# Patient Record
Sex: MALE | Race: BLACK OR AFRICAN AMERICAN | Employment: FULL TIME | URBAN - METROPOLITAN AREA
[De-identification: names, ages, dates, MRNs, and addresses within clinical notes are randomized per-mention and may not be internally consistent; named-entity substitution may affect disease eponyms.]

---

## 2023-03-04 ENCOUNTER — APPOINTMENT (OUTPATIENT)
Dept: ULTRASOUND IMAGING | Age: 53
End: 2023-03-04
Payer: COMMERCIAL

## 2023-03-04 ENCOUNTER — APPOINTMENT (OUTPATIENT)
Dept: GENERAL RADIOLOGY | Age: 53
End: 2023-03-04
Payer: COMMERCIAL

## 2023-03-04 ENCOUNTER — HOSPITAL ENCOUNTER (OUTPATIENT)
Age: 53
Setting detail: OBSERVATION
LOS: 2 days | Discharge: HOME | End: 2023-03-06
Attending: EMERGENCY MEDICINE | Admitting: STUDENT IN AN ORGANIZED HEALTH CARE EDUCATION/TRAINING PROGRAM
Payer: COMMERCIAL

## 2023-03-04 DIAGNOSIS — E11.65 HYPERGLYCEMIA DUE TO DIABETES MELLITUS (HCC): ICD-10-CM

## 2023-03-04 DIAGNOSIS — R74.8 ELEVATED LIPASE: ICD-10-CM

## 2023-03-04 DIAGNOSIS — N17.9 AKI (ACUTE KIDNEY INJURY) (HCC): Primary | ICD-10-CM

## 2023-03-04 DIAGNOSIS — E11.9 DIABETES MELLITUS, NEW ONSET (HCC): ICD-10-CM

## 2023-03-04 PROBLEM — R79.89 PSEUDOHYPONATREMIA: Status: ACTIVE | Noted: 2023-03-04

## 2023-03-04 PROBLEM — F19.10 POLYSUBSTANCE ABUSE (HCC): Status: ACTIVE | Noted: 2023-03-04

## 2023-03-04 LAB
ALBUMIN SERPL-MCNC: 3.4 G/DL (ref 3.5–5)
ALBUMIN/GLOB SERPL: 0.7 (ref 0.4–1.6)
ALP SERPL-CCNC: 118 U/L (ref 50–136)
ALT SERPL-CCNC: 41 U/L (ref 12–65)
AMPHET UR QL SCN: POSITIVE
ANION GAP SERPL CALC-SCNC: 8 MMOL/L (ref 2–11)
APPEARANCE UR: CLEAR
AST SERPL-CCNC: 62 U/L (ref 15–37)
BACTERIA URNS QL MICRO: 0 /HPF
BASOPHILS # BLD: 0.1 K/UL (ref 0–0.2)
BASOPHILS NFR BLD: 1 % (ref 0–2)
BENZODIAZ UR QL: NEGATIVE
BILIRUB SERPL-MCNC: 0.6 MG/DL (ref 0.2–1.1)
BILIRUB UR QL: NEGATIVE
BUN SERPL-MCNC: 23 MG/DL (ref 6–23)
CALCIUM SERPL-MCNC: 9.4 MG/DL (ref 8.3–10.4)
CANNABINOIDS UR QL SCN: POSITIVE
CASTS URNS QL MICRO: 0 /LPF
CHLORIDE SERPL-SCNC: 101 MMOL/L (ref 101–110)
CO2 SERPL-SCNC: 21 MMOL/L (ref 21–32)
COCAINE UR QL SCN: POSITIVE
COLOR UR: ABNORMAL
CREAT SERPL-MCNC: 2 MG/DL (ref 0.8–1.5)
CRYSTALS URNS QL MICRO: 0 /LPF
DIFFERENTIAL METHOD BLD: ABNORMAL
EOSINOPHIL # BLD: 0.6 K/UL (ref 0–0.8)
EOSINOPHIL NFR BLD: 4 % (ref 0.5–7.8)
EPI CELLS #/AREA URNS HPF: NORMAL /HPF
ERYTHROCYTE [DISTWIDTH] IN BLOOD BY AUTOMATED COUNT: 13.4 % (ref 11.9–14.6)
EST. AVERAGE GLUCOSE BLD GHB EST-MCNC: ABNORMAL MG/DL
FLUAV RNA SPEC QL NAA+PROBE: NOT DETECTED
FLUBV RNA SPEC QL NAA+PROBE: NOT DETECTED
GLOBULIN SER CALC-MCNC: 4.7 G/DL (ref 2.8–4.5)
GLUCOSE BLD STRIP.AUTO-MCNC: 276 MG/DL (ref 65–100)
GLUCOSE BLD STRIP.AUTO-MCNC: 367 MG/DL (ref 65–100)
GLUCOSE BLD STRIP.AUTO-MCNC: 378 MG/DL (ref 65–100)
GLUCOSE BLD STRIP.AUTO-MCNC: 423 MG/DL (ref 65–100)
GLUCOSE SERPL-MCNC: 441 MG/DL (ref 65–100)
GLUCOSE UR STRIP.AUTO-MCNC: 250 MG/DL
HBA1C MFR BLD: >14 % (ref 4.8–5.6)
HCT VFR BLD AUTO: 41 % (ref 41.1–50.3)
HGB BLD-MCNC: 14 G/DL (ref 13.6–17.2)
HGB UR QL STRIP: ABNORMAL
IMM GRANULOCYTES # BLD AUTO: 0.1 K/UL (ref 0–0.5)
IMM GRANULOCYTES NFR BLD AUTO: 0 % (ref 0–5)
KETONES UR QL STRIP.AUTO: ABNORMAL MG/DL
LEUKOCYTE ESTERASE UR QL STRIP.AUTO: NEGATIVE
LIPASE SERPL-CCNC: 594 U/L (ref 73–393)
LYMPHOCYTES # BLD: 5.8 K/UL (ref 0.5–4.6)
LYMPHOCYTES NFR BLD: 42 % (ref 13–44)
MCH RBC QN AUTO: 28.5 PG (ref 26.1–32.9)
MCHC RBC AUTO-ENTMCNC: 34.1 G/DL (ref 31.4–35)
MCV RBC AUTO: 83.5 FL (ref 82–102)
MONOCYTES # BLD: 1.6 K/UL (ref 0.1–1.3)
MONOCYTES NFR BLD: 12 % (ref 4–12)
MUCOUS THREADS URNS QL MICRO: 0 /LPF
NEUTS SEG # BLD: 5.8 K/UL (ref 1.7–8.2)
NEUTS SEG NFR BLD: 42 % (ref 43–78)
NITRITE UR QL STRIP.AUTO: NEGATIVE
NRBC # BLD: 0 K/UL (ref 0–0.2)
OPIATES UR QL: NEGATIVE
OTHER OBSERVATIONS: NORMAL
PH UR STRIP: 5 (ref 5–9)
PLATELET # BLD AUTO: 298 K/UL (ref 150–450)
PMV BLD AUTO: 12.1 FL (ref 9.4–12.3)
POTASSIUM SERPL-SCNC: 3.6 MMOL/L (ref 3.5–5.1)
PROT SERPL-MCNC: 8.1 G/DL (ref 6.3–8.2)
PROT UR STRIP-MCNC: ABNORMAL MG/DL
RBC # BLD AUTO: 4.91 M/UL (ref 4.23–5.6)
RBC #/AREA URNS HPF: NORMAL /HPF
SARS-COV-2 RDRP RESP QL NAA+PROBE: NOT DETECTED
SERVICE CMNT-IMP: ABNORMAL
SODIUM SERPL-SCNC: 130 MMOL/L (ref 133–143)
SOURCE: NORMAL
SP GR UR REFRACTOMETRY: 1.03 (ref 1–1.02)
UROBILINOGEN UR QL STRIP.AUTO: 0.2 EU/DL (ref 0.2–1)
WBC # BLD AUTO: 13.9 K/UL (ref 4.3–11.1)
WBC URNS QL MICRO: NORMAL /HPF

## 2023-03-04 PROCEDURE — 6360000002 HC RX W HCPCS: Performed by: PHYSICIAN ASSISTANT

## 2023-03-04 PROCEDURE — 6360000002 HC RX W HCPCS: Performed by: INTERNAL MEDICINE

## 2023-03-04 PROCEDURE — 87635 SARS-COV-2 COVID-19 AMP PRB: CPT

## 2023-03-04 PROCEDURE — 81015 MICROSCOPIC EXAM OF URINE: CPT

## 2023-03-04 PROCEDURE — 96374 THER/PROPH/DIAG INJ IV PUSH: CPT | Performed by: EMERGENCY MEDICINE

## 2023-03-04 PROCEDURE — 82962 GLUCOSE BLOOD TEST: CPT

## 2023-03-04 PROCEDURE — 81001 URINALYSIS AUTO W/SCOPE: CPT

## 2023-03-04 PROCEDURE — 1100000000 HC RM PRIVATE

## 2023-03-04 PROCEDURE — 80307 DRUG TEST PRSMV CHEM ANLYZR: CPT

## 2023-03-04 PROCEDURE — 2580000003 HC RX 258: Performed by: INTERNAL MEDICINE

## 2023-03-04 PROCEDURE — 85025 COMPLETE CBC W/AUTO DIFF WBC: CPT

## 2023-03-04 PROCEDURE — 83690 ASSAY OF LIPASE: CPT

## 2023-03-04 PROCEDURE — 87502 INFLUENZA DNA AMP PROBE: CPT

## 2023-03-04 PROCEDURE — 80053 COMPREHEN METABOLIC PANEL: CPT

## 2023-03-04 PROCEDURE — 71045 X-RAY EXAM CHEST 1 VIEW: CPT

## 2023-03-04 PROCEDURE — 83036 HEMOGLOBIN GLYCOSYLATED A1C: CPT

## 2023-03-04 PROCEDURE — 76775 US EXAM ABDO BACK WALL LIM: CPT

## 2023-03-04 PROCEDURE — 2580000003 HC RX 258: Performed by: PHYSICIAN ASSISTANT

## 2023-03-04 PROCEDURE — 96361 HYDRATE IV INFUSION ADD-ON: CPT | Performed by: EMERGENCY MEDICINE

## 2023-03-04 PROCEDURE — 99285 EMERGENCY DEPT VISIT HI MDM: CPT | Performed by: EMERGENCY MEDICINE

## 2023-03-04 PROCEDURE — 6370000000 HC RX 637 (ALT 250 FOR IP): Performed by: INTERNAL MEDICINE

## 2023-03-04 PROCEDURE — 6370000000 HC RX 637 (ALT 250 FOR IP): Performed by: PHYSICIAN ASSISTANT

## 2023-03-04 PROCEDURE — 36415 COLL VENOUS BLD VENIPUNCTURE: CPT

## 2023-03-04 RX ORDER — MAGNESIUM HYDROXIDE/ALUMINUM HYDROXICE/SIMETHICONE 120; 1200; 1200 MG/30ML; MG/30ML; MG/30ML
30 SUSPENSION ORAL EVERY 6 HOURS PRN
Status: DISCONTINUED | OUTPATIENT
Start: 2023-03-04 | End: 2023-03-06 | Stop reason: HOSPADM

## 2023-03-04 RX ORDER — INSULIN GLARGINE 100 [IU]/ML
10 INJECTION, SOLUTION SUBCUTANEOUS NIGHTLY
Status: DISCONTINUED | OUTPATIENT
Start: 2023-03-04 | End: 2023-03-05

## 2023-03-04 RX ORDER — ONDANSETRON 4 MG/1
4 TABLET, ORALLY DISINTEGRATING ORAL EVERY 8 HOURS PRN
Status: DISCONTINUED | OUTPATIENT
Start: 2023-03-04 | End: 2023-03-06 | Stop reason: HOSPADM

## 2023-03-04 RX ORDER — ONDANSETRON 2 MG/ML
4 INJECTION INTRAMUSCULAR; INTRAVENOUS EVERY 6 HOURS PRN
Status: DISCONTINUED | OUTPATIENT
Start: 2023-03-04 | End: 2023-03-06 | Stop reason: HOSPADM

## 2023-03-04 RX ORDER — HYDRALAZINE HYDROCHLORIDE 25 MG/1
25 TABLET, FILM COATED ORAL EVERY 8 HOURS PRN
Status: DISCONTINUED | OUTPATIENT
Start: 2023-03-04 | End: 2023-03-06 | Stop reason: HOSPADM

## 2023-03-04 RX ORDER — ACETAMINOPHEN 325 MG/1
650 TABLET ORAL EVERY 6 HOURS PRN
Status: DISCONTINUED | OUTPATIENT
Start: 2023-03-04 | End: 2023-03-06 | Stop reason: HOSPADM

## 2023-03-04 RX ORDER — ACETAMINOPHEN 650 MG/1
650 SUPPOSITORY RECTAL EVERY 6 HOURS PRN
Status: DISCONTINUED | OUTPATIENT
Start: 2023-03-04 | End: 2023-03-06 | Stop reason: HOSPADM

## 2023-03-04 RX ORDER — HALOPERIDOL 5 MG/ML
INJECTION INTRAMUSCULAR
Status: DISPENSED
Start: 2023-03-04 | End: 2023-03-04

## 2023-03-04 RX ORDER — 0.9 % SODIUM CHLORIDE 0.9 %
1000 INTRAVENOUS SOLUTION INTRAVENOUS
Status: COMPLETED | OUTPATIENT
Start: 2023-03-04 | End: 2023-03-04

## 2023-03-04 RX ORDER — SODIUM CHLORIDE 0.9 % (FLUSH) 0.9 %
5-40 SYRINGE (ML) INJECTION EVERY 12 HOURS SCHEDULED
Status: DISCONTINUED | OUTPATIENT
Start: 2023-03-04 | End: 2023-03-06 | Stop reason: HOSPADM

## 2023-03-04 RX ORDER — SODIUM CHLORIDE 9 MG/ML
INJECTION, SOLUTION INTRAVENOUS PRN
Status: DISCONTINUED | OUTPATIENT
Start: 2023-03-04 | End: 2023-03-06 | Stop reason: HOSPADM

## 2023-03-04 RX ORDER — LORAZEPAM 2 MG/ML
2 INJECTION INTRAMUSCULAR ONCE
Status: COMPLETED | OUTPATIENT
Start: 2023-03-04 | End: 2023-03-04

## 2023-03-04 RX ORDER — POTASSIUM CHLORIDE 7.45 MG/ML
10 INJECTION INTRAVENOUS PRN
Status: DISCONTINUED | OUTPATIENT
Start: 2023-03-04 | End: 2023-03-05

## 2023-03-04 RX ORDER — SODIUM CHLORIDE 0.9 % (FLUSH) 0.9 %
5-40 SYRINGE (ML) INJECTION PRN
Status: DISCONTINUED | OUTPATIENT
Start: 2023-03-04 | End: 2023-03-06 | Stop reason: HOSPADM

## 2023-03-04 RX ORDER — POLYETHYLENE GLYCOL 3350 17 G/17G
17 POWDER, FOR SOLUTION ORAL DAILY PRN
Status: DISCONTINUED | OUTPATIENT
Start: 2023-03-04 | End: 2023-03-06 | Stop reason: HOSPADM

## 2023-03-04 RX ORDER — INSULIN LISPRO 100 [IU]/ML
0-10 INJECTION, SOLUTION INTRAVENOUS; SUBCUTANEOUS
Status: DISCONTINUED | OUTPATIENT
Start: 2023-03-04 | End: 2023-03-06 | Stop reason: HOSPADM

## 2023-03-04 RX ORDER — HEPARIN SODIUM 5000 [USP'U]/ML
5000 INJECTION, SOLUTION INTRAVENOUS; SUBCUTANEOUS EVERY 8 HOURS SCHEDULED
Status: DISCONTINUED | OUTPATIENT
Start: 2023-03-04 | End: 2023-03-06 | Stop reason: HOSPADM

## 2023-03-04 RX ORDER — MAGNESIUM SULFATE IN WATER 40 MG/ML
2000 INJECTION, SOLUTION INTRAVENOUS PRN
Status: DISCONTINUED | OUTPATIENT
Start: 2023-03-04 | End: 2023-03-05

## 2023-03-04 RX ORDER — 0.9 % SODIUM CHLORIDE 0.9 %
1000 INTRAVENOUS SOLUTION INTRAVENOUS ONCE
Status: DISCONTINUED | OUTPATIENT
Start: 2023-03-04 | End: 2023-03-06 | Stop reason: HOSPADM

## 2023-03-04 RX ORDER — SODIUM CHLORIDE 9 MG/ML
INJECTION, SOLUTION INTRAVENOUS CONTINUOUS
Status: DISCONTINUED | OUTPATIENT
Start: 2023-03-04 | End: 2023-03-06 | Stop reason: HOSPADM

## 2023-03-04 RX ORDER — BISACODYL 10 MG
10 SUPPOSITORY, RECTAL RECTAL DAILY PRN
Status: DISCONTINUED | OUTPATIENT
Start: 2023-03-04 | End: 2023-03-06 | Stop reason: HOSPADM

## 2023-03-04 RX ORDER — DEXTROSE MONOHYDRATE 100 MG/ML
INJECTION, SOLUTION INTRAVENOUS CONTINUOUS PRN
Status: DISCONTINUED | OUTPATIENT
Start: 2023-03-04 | End: 2023-03-06 | Stop reason: HOSPADM

## 2023-03-04 RX ORDER — LORAZEPAM 2 MG/ML
2 INJECTION INTRAMUSCULAR EVERY 6 HOURS PRN
Status: DISCONTINUED | OUTPATIENT
Start: 2023-03-04 | End: 2023-03-06 | Stop reason: HOSPADM

## 2023-03-04 RX ORDER — POTASSIUM CHLORIDE 7.45 MG/ML
10 INJECTION INTRAVENOUS PRN
Status: DISCONTINUED | OUTPATIENT
Start: 2023-03-04 | End: 2023-03-04 | Stop reason: SDUPTHER

## 2023-03-04 RX ORDER — POTASSIUM CHLORIDE 20 MEQ/1
40 TABLET, EXTENDED RELEASE ORAL PRN
Status: DISCONTINUED | OUTPATIENT
Start: 2023-03-04 | End: 2023-03-05

## 2023-03-04 RX ORDER — FAMOTIDINE 20 MG/1
10 TABLET, FILM COATED ORAL DAILY PRN
Status: DISCONTINUED | OUTPATIENT
Start: 2023-03-04 | End: 2023-03-06 | Stop reason: HOSPADM

## 2023-03-04 RX ADMIN — HEPARIN SODIUM 5000 UNITS: 5000 INJECTION INTRAVENOUS; SUBCUTANEOUS at 21:06

## 2023-03-04 RX ADMIN — INSULIN LISPRO 10 UNITS: 100 INJECTION, SOLUTION INTRAVENOUS; SUBCUTANEOUS at 16:54

## 2023-03-04 RX ADMIN — INSULIN LISPRO 6 UNITS: 100 INJECTION, SOLUTION INTRAVENOUS; SUBCUTANEOUS at 21:04

## 2023-03-04 RX ADMIN — LORAZEPAM 2 MG: 2 INJECTION INTRAMUSCULAR; INTRAVENOUS at 23:49

## 2023-03-04 RX ADMIN — LORAZEPAM 2 MG: 2 INJECTION INTRAMUSCULAR; INTRAVENOUS at 03:42

## 2023-03-04 RX ADMIN — HEPARIN SODIUM 5000 UNITS: 5000 INJECTION INTRAVENOUS; SUBCUTANEOUS at 14:25

## 2023-03-04 RX ADMIN — SODIUM CHLORIDE 1000 ML: 9 INJECTION, SOLUTION INTRAVENOUS at 04:19

## 2023-03-04 RX ADMIN — SODIUM CHLORIDE, PRESERVATIVE FREE 10 ML: 5 INJECTION INTRAVENOUS at 21:06

## 2023-03-04 RX ADMIN — INSULIN HUMAN 10 UNITS: 100 INJECTION, SOLUTION PARENTERAL at 04:21

## 2023-03-04 RX ADMIN — HYDRALAZINE HYDROCHLORIDE 25 MG: 25 TABLET, FILM COATED ORAL at 20:11

## 2023-03-04 RX ADMIN — SODIUM CHLORIDE: 9 INJECTION, SOLUTION INTRAVENOUS at 16:53

## 2023-03-04 RX ADMIN — INSULIN GLARGINE 10 UNITS: 100 INJECTION, SOLUTION SUBCUTANEOUS at 21:04

## 2023-03-04 ASSESSMENT — ENCOUNTER SYMPTOMS
SORE THROAT: 0
ABDOMINAL PAIN: 0
COUGH: 0
RHINORRHEA: 0
DIARRHEA: 0
CONSTIPATION: 0
NAUSEA: 0
SHORTNESS OF BREATH: 1
BACK PAIN: 0

## 2023-03-04 ASSESSMENT — PAIN - FUNCTIONAL ASSESSMENT
PAIN_FUNCTIONAL_ASSESSMENT: 0-10
PAIN_FUNCTIONAL_ASSESSMENT: 0-10
PAIN_FUNCTIONAL_ASSESSMENT: NONE - DENIES PAIN

## 2023-03-04 ASSESSMENT — PAIN SCALES - GENERAL
PAINLEVEL_OUTOF10: 0

## 2023-03-04 NOTE — PROGRESS NOTES
Comprehensive Nutrition Assessment    Type and Reason for Visit: Initial, Positive Nutrition Screen  Malnutrition Screening Tool: Malnutrition Screen  Have you recently lost weight without trying?: 2 to 13 pounds (1 point)  Have you been eating poorly because of a decreased appetite?: Yes (1 point)  Malnutrition Screening Tool Score: 2    Nutrition Recommendations/Plan:   Meals and Snacks:  Diet: Continue current order  Nutrition Supplement Therapy:  Medical food supplement therapy:  Initiate Ensure High Protein once per day (this provides 160 kcal and 16 grams protein per bottle)     Malnutrition Assessment:  Malnutrition Status: No malnutrition  no gigi wasting of fat or muscle stores noted    Nutrition Assessment:  Nutrition History: Pt reports decreased appetite over the last few weeks. States he would usually eat 2-3 meals/day but has only been eating one meal/day more recently. He denies any significant weight loss but does believe his weight is starting to trend down. Do You Have Any Cultural, Hinduism, or Ethnic Food Preferences?: No   Nutrition Background:       Pt does not have any significant medical hx. He presents this admission for SOB x3 days. Pt noted to be \"flopping\" and \"flailing\" in the ER. UDS + for amphetamine, cocaine, and THC. Pt also newly diagnosed T2DM. Nutrition Interval:  RD met w/pt at bedside. Pt reports appetite improving, ate 100% of his lunch today. Pt is agreeable to consuming nutrition supplements during admission to help increase kcal/protein intake. Ensure HP once daily added. Current Nutrition Therapies:  ADULT DIET; Regular    Current Intake:   Average Meal Intake: % (per patient) Average Supplements Intake: None Ordered      Anthropometric Measures:  Height: 6' (182.9 cm)  Current Body Wt: 220 lb 0.3 oz (99.8 kg) (3/4), Weight source: Other (Comment) (estimated)  BMI: 29.8, Overweight (BMI 25.0-29. 9)     Ideal Body Weight (Kg) (Calculated): 81 kg (178 lbs), 123.6 %  Usual Body Wt:  , Percent weight change:         BMI Category Overweight (BMI 25.0-29. 9)    Estimated Daily Nutrient Needs:  Energy (kcal/day): 4457-5536 (18-22 kcal/kg) (Kcal/kg Weight used: 99.8 kg Current  Protein (g/day):  (0.8-1 g/kg) Weight Used: (Current) 99.8 kg  Fluid (ml/day):   (1 ml/kcal)    Nutrition Diagnosis:   Predicted inadequate energy intake related to  (decreased appetite) as evidenced by  (pt report of eating only one meal/day for several weeks PTA)    Nutrition Interventions:   Food and/or Nutrient Delivery: Continue Current Diet, Start Oral Nutrition Supplement     Coordination of Nutrition Care: Continue to monitor while inpatient  Plan of Care discussed with: patient    Goals:       Active Goal: Meet at least 75% of estimated needs, by next RD assessment       Nutrition Monitoring and Evaluation:      Food/Nutrient Intake Outcomes: Food and Nutrient Intake, Supplement Intake  Physical Signs/Symptoms Outcomes: Weight, Meal Time Behavior    Discharge Planning:    Continue current diet    Bernardo Brooks RD

## 2023-03-04 NOTE — ED PROVIDER NOTES
Emergency Department Provider Note                   PCP:                None None               Age: 50 y.o. Sex: male     DISPOSITION Decision To Admit 03/04/2023 07:15:38 AM       ICD-10-CM    1. OMAR (acute kidney injury) ([facility] [address]  N17.9       2. Elevated lipase  R74.8       3. Diabetes mellitus, new onset ([facility] [address]  E11.9           MEDICAL DECISION MAKING  Complexity of Problems Addressed:  1 acute illness    Data Reviewed and Analyzed:  Category 1:     I ordered each unique test.  I reviewed the results of each unique test.        Category 2:   I independently ordered and reviewed the X-rays. No acute process  I independently ordered and reviewed the labs. Category 3: Discussion of management or test interpretation. Patient is a 53-year-old male, poor historian, who presented to the facility today for 3 days of shortness of breath. On arrival patient was flailing and seem to be under the influence of some drugs. We are able to get the patient calm down with some medications. Labs here today show a leukocytosis of 13.9. Elevated initial glucose of 441. Elevated lipase of 594. Patient's creatinine is 2.0. Patient does admit to being a drinker. He is unable to tell me if he has any kidney history but denies being told he is ever had issues with his kidneys. Patient also denies any known history of diabetes. Patient was given some insulin and fluids and glucose is coming down into the 300s now. Patient denies any sort of pain so no CT scan of the abdomen was obtained. I have spoken to the hospitalist regarding admission for the patient for this acute kidney injury.         Risk of Complications and/or Morbidity of Patient Management:  Patient was admitted I have communication with admitting physician     Trish Elmore is a 50 y.o. male who presents to the Emergency Department with chief complaint of    Chief Complaint   Patient presents with    Shortness of Breath      Patient is a 53-year-old male, poor historian, who presents to facility today complaining of shortness of breath for the past 3 days. In triage patient is flopping and flailing and twisting in the chair repeatedly saying he feels short of breath. Patient states nothing he has taken his Benadryl to help. He denies any significant health history. He denies any cardiac or pulmonary history specifically. He denies any sick contacts. He denies any pain anywhere including the chest or abdomen. He denies any cough. No headache, sore throat, congestion. He states he does drink alcohol occasionally. Patient reports smoking. No nausea or vomiting constipation or diarrhea. Patient denies any drug use. Patient states he has been like this for the past 3 days. The history is provided by the patient. Review of Systems   Constitutional:  Negative for chills and fever. HENT:  Negative for congestion, rhinorrhea and sore throat. Respiratory:  Positive for shortness of breath. Negative for cough. Cardiovascular:  Negative for chest pain. Gastrointestinal:  Negative for abdominal pain, constipation, diarrhea and nausea. Genitourinary:  Negative for dysuria, frequency and urgency. Musculoskeletal:  Negative for back pain and gait problem. Neurological:  Negative for dizziness, syncope and headaches. Psychiatric/Behavioral:  Negative for agitation, behavioral problems and confusion. All other systems reviewed and are negative. Vitals signs and nursing note reviewed. Patient Vitals for the past 4 hrs:   Pulse Resp BP SpO2   03/04/23 0429 87 20 (!) 141/96 97 %   03/04/23 0414 92 20 (!) 143/96 92 %   03/04/23 0359 92 19 136/87 94 %   03/04/23 0344 93 22 (!) 143/86 95 %   03/04/23 0329 (!) 122 22 -- 98 %          Physical Exam  Vitals and nursing note reviewed. Constitutional:       General: He is not in acute distress. Appearance: Normal appearance. He is well-developed. He is diaphoretic.  He is not ill-appearing or toxic-appearing.   HENT:      Head: Normocephalic and atraumatic.      Right Ear: External ear normal.      Left Ear: External ear normal.   Eyes:      Extraocular Movements: Extraocular movements intact.      Conjunctiva/sclera: Conjunctivae normal.   Cardiovascular:      Rate and Rhythm: Normal rate and regular rhythm.      Pulses: Normal pulses.      Heart sounds: Normal heart sounds.   Pulmonary:      Effort: Pulmonary effort is normal.      Breath sounds: Normal breath sounds. No decreased breath sounds, wheezing, rhonchi or rales.   Chest:      Chest wall: No tenderness.   Abdominal:      General: Abdomen is flat. Bowel sounds are normal. There is no distension.      Palpations: Abdomen is soft.      Tenderness: There is no abdominal tenderness. There is no guarding or rebound.   Musculoskeletal:         General: Normal range of motion.      Cervical back: Normal range of motion.   Skin:     General: Skin is warm and moist.      Capillary Refill: Capillary refill takes less than 2 seconds.      Findings: No erythema or rash.   Neurological:      General: No focal deficit present.      Mental Status: He is alert and oriented to person, place, and time.   Psychiatric:         Mood and Affect: Mood normal.         Behavior: Behavior normal.        Procedures     Orders Placed This Encounter   Procedures    COVID-19, Rapid    Influenza A/B, Molecular    XR CHEST PORTABLE    CBC with Auto Differential    Comprehensive Metabolic Panel    Lipase    Drug Screen Psych, Urine    Urinalysis    POCT Glucose    POCT Glucose        Medications   0.9 % sodium chloride bolus (has no administration in time range)   LORazepam (ATIVAN) injection 2 mg (2 mg IntraVENous Given 3/4/23 0872)   0.9 % sodium chloride bolus (0 mLs IntraVENous Stopped 3/4/23 2739)   insulin regular (HUMULIN R;NOVOLIN R) injection 10 Units (10 Units SubCUTAneous Given 3/4/23 6889)       New Prescriptions    No medications on file        No past medical  history on file. No past surgical history on file. No family history on file. Social History     Socioeconomic History    Marital status: Single        Allergies: Patient has no allergy information on record. Previous Medications    No medications on file        Results for orders placed or performed during the hospital encounter of 03/04/23   COVID-19, Rapid    Specimen: Nasopharyngeal   Result Value Ref Range    Source NASAL      SARS-CoV-2, Rapid Not detected NOTD     Influenza A/B, Molecular    Specimen: Not Specified   Result Value Ref Range    Influenza A, DANELLE Not detected NOTD      Influenza B, DANELLE Not detected NOTD     XR CHEST PORTABLE    Narrative    EXAMINATION: XR CHEST PORTABLE    DATE: 3/4/2023 5:30 AM    INDICATION: ; shortness of breath    COMPARISON:  None. FINDINGS:  Moderate hypoinflation lungs with some crowding of lung markings. Minor   elevation right hemidiaphragm. .    No focal consolidation, pleural effusion, or pneumothorax. Minor cardiomegaly    No acute osseous abnormality. .    Impression    1. Minor cardiomegaly. No overt failure.            Leonor Castillo M.D.   3/4/2023 6:03:00 AM   CBC with Auto Differential   Result Value Ref Range    WBC 13.9 (H) 4.3 - 11.1 K/uL    RBC 4.91 4.23 - 5.6 M/uL    Hemoglobin 14.0 13.6 - 17.2 g/dL    Hematocrit 41.0 (L) 41.1 - 50.3 %    MCV 83.5 82 - 102 FL    MCH 28.5 26.1 - 32.9 PG    MCHC 34.1 31.4 - 35.0 g/dL    RDW 13.4 11.9 - 14.6 %    Platelets 177 514 - 698 K/uL    MPV 12.1 9.4 - 12.3 FL    nRBC 0.00 0.0 - 0.2 K/uL    Differential Type AUTOMATED      Seg Neutrophils 42 (L) 43 - 78 %    Lymphocytes 42 13 - 44 %    Monocytes 12 4.0 - 12.0 %    Eosinophils % 4 0.5 - 7.8 %    Basophils 1 0.0 - 2.0 %    Immature Granulocytes 0 0.0 - 5.0 %    Segs Absolute 5.8 1.7 - 8.2 K/UL    Absolute Lymph # 5.8 (H) 0.5 - 4.6 K/UL    Absolute Mono # 1.6 (H) 0.1 - 1.3 K/UL    Absolute Eos # 0.6 0.0 - 0.8 K/UL    Basophils Absolute 0.1 0.0 - 0.2 K/UL    Absolute Immature Granulocyte 0.1 0.0 - 0.5 K/UL   Comprehensive Metabolic Panel   Result Value Ref Range    Sodium 130 (L) 133 - 143 mmol/L    Potassium 3.6 3.5 - 5.1 mmol/L    Chloride 101 101 - 110 mmol/L    CO2 21 21 - 32 mmol/L    Anion Gap 8 2 - 11 mmol/L    Glucose 441 (H) 65 - 100 mg/dL    BUN 23 6 - 23 MG/DL    Creatinine 2.00 (H) 0.8 - 1.5 MG/DL    Est, Glom Filt Rate 40 (L) >60 ml/min/1.73m2    Calcium 9.4 8.3 - 10.4 MG/DL    Total Bilirubin 0.6 0.2 - 1.1 MG/DL    ALT 41 12 - 65 U/L    AST 62 (H) 15 - 37 U/L    Alk Phosphatase 118 50 - 136 U/L    Total Protein 8.1 6.3 - 8.2 g/dL    Albumin 3.4 (L) 3.5 - 5.0 g/dL    Globulin 4.7 (H) 2.8 - 4.5 g/dL    Albumin/Globulin Ratio 0.7 0.4 - 1.6     Lipase   Result Value Ref Range    Lipase 594 (H) 73 - 393 U/L   POCT Glucose   Result Value Ref Range    POC Glucose 423 (H) 65 - 100 mg/dL    Performed by: [facility]ixElizabeth[facility]N    POCT Glucose   Result Value Ref Range    POC Glucose 367 (H) 65 - 100 mg/dL    Performed by: [facility]         XR CHEST PORTABLE   Final Result      1. Minor cardiomegaly. No overt failure. Micky Reynoso M.D.    3/4/2023 6:03:00 AM                        Voice dictation software was used during the making of this note. This software is not perfect and grammatical and other typographical errors may be present. This note has not been completely proofread for errors.      Zulma Morales PA-C  03/04/23 0721       Zulma Morales PA-C  03/04/23 7353

## 2023-03-04 NOTE — ED NOTES
TRANSFER - OUT REPORT:    Verbal report given to Chris Miu on [facility]  being transferred to  for routine progression of patient care       Report consisted of patient's Situation, Background, Assessment and   Recommendations(SBAR). Information from the following report(s) Nurse Handoff Report was reviewed with the receiving nurse. [place] Assessment: Presents to emergency department  because of falls (Syncope, seizure, or loss of consciousness): Yes, Age > 79: No, Altered Mental Status, Intoxication with alcohol or substance confusion (Disorientation, impaired judgment, poor safety awaremess, or inability to follow instructions): Yes, Impaired Mobility: Ambulates or transfers with assistive devices or assistance; Unable to ambulate or transer.: Yes, Nursing Judgement: Yes  Lines:   Peripheral IV 03/04/23 Left Forearm (Active)   Site Assessment Clean, dry & intact 03/04/23 0330   Line Status Blood return noted 03/04/23 0330   Phlebitis Assessment No symptoms 03/04/23 0330   Infiltration Assessment 0 03/04/23 0330        Opportunity for questions and clarification was provided.                  Александр Patel RN  03/04/23 8150

## 2023-03-04 NOTE — H&P
Hospitalist History and Physical   Admit Date:  3/4/2023  3:07 AM   Name:  Ryan Mckeon   Age:  50 y.o. Sex:  male  :  1970   MRN:  [id number]   Room:  [facility]    Presenting Complaint: Shortness of Breath     Reason(s) for Admission: OMAR (acute kidney injury) ([facility] [address] [N17.9]  Diabetes mellitus, new onset ([facility] [address] [E11.9]  Elevated lipase [R74.8]     History of Present Illness:   Ryan Mckeon is a 50 y.o. male with no significant past medical history who presented to the emergency room with shortness of breath for past 3 days. Patient was noted to be flopping and flailing in the emergency room requiring Ativan to calm him down. Patient denies any fever, chills, chest pain, nausea, vomiting, diarrhea. Patient reports drinking alcohol 3 times daily and reports drinks severely when he does. Last drink was . Patient reports that he was with his friends on  and had smoked marijuana at that time. Noticed that he started having tremors and felt funny since then. Reports that his \" flailing of flopping has started around that time. Denies any other drug use. Work-up in the ED with sodium of 130, creatinine of 2.0, elevated lipase of 594, glucose of 441, A1c of >14, leukocytosis of 13.9. Urine drug screen positive for amphetamine, cocaine and THC. UA without any signs of UTI. CXR  without any acute cardiopulmonary processes. Assessment & Plan: OMAR   Cr of 2.0. unknown baseline. Does report poor PO in last several days  - IVF  - check renal ultrasound and urine electrolytes    Hyperglycemia due to T2DM, undiagnosed  A1c > 14  - start lantus 10U qhS  - ISS  - diabetic educator consult    Hyponatremia, likely pseudohyponatremia from hyperglycemia  - monitor    Polysubstance abuse  - alcohol as well as UDS +ve for amphetamine+THC+coccaine  - Ativan prn for withdrawal/agitations. Reports last alcohol use . Diet: ADULT DIET;  Regular  VTE prophylaxis: Heparin  Code status: Full Code    Hospital Problems:  Principal Problem:    OMAR (acute kidney injury) ([facility] [address]  Active Problems:    Pseudohyponatremia    Hyperglycemia due to diabetes mellitus ([facility] [address]    Polysubstance abuse ([facility]  Resolved Problems:    * No resolved hospital problems. *       Past History: PMHx: none  Psx: none      Social History     Tobacco Use    Smoking status: Not on file    Smokeless tobacco: Not on file   Substance Use Topics    Alcohol use: Not on file      Social History     Substance and Sexual Activity   Drug Use Not on file       No family history on file. There is no immunization history on file for this patient. No Known Allergies  Prior to Admit Medications:  Current Outpatient Medications   Medication Instructions    Multiple Vitamin (MULTIVITAMIN ADULT PO) 1 tablet, Oral, DAILY         Objective:   Patient Vitals for the past 24 hrs:   Temp Pulse Resp BP SpO2   03/04/23 1521 98.2 °F (36.8 °C) 88 20 (!) 164/115 94 %   03/04/23 1152 97.7 °F (36.5 °C) 84 20 128/85 98 %   03/04/23 0816 97 °F (36.1 °C) 83 20 (!) 156/100 94 %   03/04/23 0730 -- 82 16 (!) 166/134 98 %   03/04/23 0429 -- 87 20 (!) 141/96 97 %   03/04/23 0414 -- 92 20 (!) 143/96 92 %   03/04/23 0359 -- 92 19 136/87 94 %   03/04/23 0344 -- 93 22 (!) 143/86 95 %   03/04/23 0329 -- (!) 122 22 -- 98 %       Oxygen Therapy  SpO2: 94 %  Pulse Oximetry Type: Continuous  Pulse via Oximetry: 87 beats per minute  Pulse Oximeter Device Mode: Continuous  O2 Device: None (Room air)    Estimated body mass index is 29.84 kg/m² as calculated from the following:    Height as of this encounter: 6' (1.829 m). Weight as of this encounter: 220 lb (99.8 kg). Intake/Output Summary (Last 24 hours) at 3/4/2023 1544  Last data filed at 3/4/2023 1429  Gross per 24 hour   Intake 1000 ml   Output 300 ml   Net 700 ml         Physical Exam:    Blood pressure (!) 164/115, pulse 88, temperature 98.2 °F (36.8 °C), temperature source Oral, resp.  rate 20, height 6' (1.829 m), weight 220 lb (99.8 kg), SpO2 94 %. General:    Well nourished. Occasionally flailing and flopping  Head:  Normocephalic, atraumatic  Eyes:  Sclerae appear normal.  Pupils equally round. ENT:  Nares appear normal.  Moist oral mucosa  Neck:  No restricted ROM. Trachea midline   CV:   RRR. No m/r/g. No jugular venous distension. Lungs:   CTAB. No wheezing, rhonchi, or rales. Symmetric expansion. Abdomen:   Soft, nontender, nondistended. Extremities: No cyanosis or clubbing. No edema  Skin:     No rashes and normal coloration. Warm and dry. Neuro:  CN II-XII grossly intact. Sensation intact. Psych:  Normal mood and affect.       I have personally reviewed labs and tests:  Recent Labs:  Recent Results (from the past 24 hour(s))   CBC with Auto Differential    Collection Time: 03/04/23  2:49 AM   Result Value Ref Range    WBC 13.9 (H) 4.3 - 11.1 K/uL    RBC 4.91 4.23 - 5.6 M/uL    Hemoglobin 14.0 13.6 - 17.2 g/dL    Hematocrit 41.0 (L) 41.1 - 50.3 %    MCV 83.5 82 - 102 FL    MCH 28.5 26.1 - 32.9 PG    MCHC 34.1 31.4 - 35.0 g/dL    RDW 13.4 11.9 - 14.6 %    Platelets 680 126 - 571 K/uL    MPV 12.1 9.4 - 12.3 FL    nRBC 0.00 0.0 - 0.2 K/uL    Differential Type AUTOMATED      Seg Neutrophils 42 (L) 43 - 78 %    Lymphocytes 42 13 - 44 %    Monocytes 12 4.0 - 12.0 %    Eosinophils % 4 0.5 - 7.8 %    Basophils 1 0.0 - 2.0 %    Immature Granulocytes 0 0.0 - 5.0 %    Segs Absolute 5.8 1.7 - 8.2 K/UL    Absolute Lymph # 5.8 (H) 0.5 - 4.6 K/UL    Absolute Mono # 1.6 (H) 0.1 - 1.3 K/UL    Absolute Eos # 0.6 0.0 - 0.8 K/UL    Basophils Absolute 0.1 0.0 - 0.2 K/UL    Absolute Immature Granulocyte 0.1 0.0 - 0.5 K/UL   Comprehensive Metabolic Panel    Collection Time: 03/04/23  2:49 AM   Result Value Ref Range    Sodium 130 (L) 133 - 143 mmol/L    Potassium 3.6 3.5 - 5.1 mmol/L    Chloride 101 101 - 110 mmol/L    CO2 21 21 - 32 mmol/L    Anion Gap 8 2 - 11 mmol/L    Glucose 441 (H) 65 - 100 mg/dL    BUN 23 6 - 23 MG/DL    Creatinine 2.00 (H) 0.8 - 1.5 MG/DL    Est, Glom Filt Rate 40 (L) >60 ml/min/1.73m2    Calcium 9.4 8.3 - 10.4 MG/DL    Total Bilirubin 0.6 0.2 - 1.1 MG/DL    ALT 41 12 - 65 U/L    AST 62 (H) 15 - 37 U/L    Alk Phosphatase 118 50 - 136 U/L    Total Protein 8.1 6.3 - 8.2 g/dL    Albumin 3.4 (L) 3.5 - 5.0 g/dL    Globulin 4.7 (H) 2.8 - 4.5 g/dL    Albumin/Globulin Ratio 0.7 0.4 - 1.6     Lipase    Collection Time: 03/04/23  2:49 AM   Result Value Ref Range    Lipase 594 (H) 73 - 393 U/L   Hemoglobin A1C    Collection Time: 03/04/23  2:49 AM   Result Value Ref Range    Hemoglobin A1C >14.0 (H) 4.8 - 5.6 %    eAG Cannot be calculated mg/dL   POCT Glucose    Collection Time: 03/04/23  4:23 AM   Result Value Ref Range    POC Glucose 423 (H) 65 - 100 mg/dL    Performed by: Octavia Oh    COVID-19, Rapid    Collection Time: 03/04/23  4:26 AM    Specimen: Nasopharyngeal   Result Value Ref Range    Source NASAL      SARS-CoV-2, Rapid Not detected NOTD     Influenza A/B, Molecular    Collection Time: 03/04/23  4:26 AM    Specimen: Not Specified   Result Value Ref Range    Influenza A, DANELLE Not detected NOTD      Influenza B, DANELLE Not detected NOTD     POCT Glucose    Collection Time: 03/04/23  5:34 AM   Result Value Ref Range    POC Glucose 367 (H) 65 - 100 mg/dL    Performed by: Annie    Drug Screen Psych, Urine    Collection Time: 03/04/23  6:10 AM   Result Value Ref Range    Benzodiazepines, Urine Negative NEG      Opiates, Urine Negative NEG      Amphetamine, Urine Positive (A) NEG      Cocaine, Urine Positive (A) NEG      THC, TH-Cannabinol, Urine Positive (A) NEG     Urinalysis    Collection Time: 03/04/23  6:10 AM   Result Value Ref Range    Color, UA YELLOW/STRAW      Appearance CLEAR      Specific Gravity, UA 1.034 (H) 1.001 - 1.023      pH, Urine 5.0 5.0 - 9.0      Protein, UA TRACE (A) NEG mg/dL    Glucose,  mg/dL    Ketones, Urine TRACE (A) NEG mg/dL    Bilirubin Urine Negative NEG Blood, Urine MODERATE (A) NEG      Urobilinogen, Urine 0.2 0.2 - 1.0 EU/dL    Nitrite, Urine Negative NEG      Leukocyte Esterase, Urine Negative NEG     Urinalysis, Micro    Collection Time: 03/04/23  6:10 AM   Result Value Ref Range    WBC, UA 0-3 0 /hpf    RBC, UA 0-3 0 /hpf    Epithelial Cells UA 0-3 0 /hpf    BACTERIA, URINE 0 0 /hpf    Casts 0 0 /lpf    Crystals 0 0 /LPF    Mucus, UA 0 0 /lpf    Other observations RESULTS VERIFIED MANUALLY         I have personally reviewed imaging studies:  XR CHEST PORTABLE    Result Date: 3/4/2023  EXAMINATION: XR CHEST PORTABLE DATE: 3/4/2023 5:30 AM INDICATION: ; shortness of breath COMPARISON:  None. FINDINGS: Moderate hypoinflation lungs with some crowding of lung markings. Minor elevation right hemidiaphragm. . No focal consolidation, pleural effusion, or pneumothorax. Minor cardiomegaly No acute osseous abnormality. .    1. Minor cardiomegaly. No overt failure. Van Snow M.D. 3/4/2023 6:03:00 AM    US RETROPERITONEAL LIMITED    Result Date: 3/4/2023  Renal ultrasound INDICATION: Renal insufficiency FINDINGS: The right kidney measures 12.2 cm in length. Left kidney measures 12.8 cm. Renal cortical echotexture is normal.  There is a tiny 1 cm cyst in the left upper pole. No significant renal mass. No hydronephrosis. Urinary bladder is unremarkable. No ascites. No acute findings. No evidence of renal obstruction. Echocardiogram:  No results found for this or any previous visit.         Orders Placed This Encounter   Medications    LORazepam (ATIVAN) injection 2 mg    0.9 % sodium chloride bolus    insulin regular (HUMULIN R;NOVOLIN R) injection 10 Units    0.9 % sodium chloride bolus    sodium chloride flush 0.9 % injection 5-40 mL    sodium chloride flush 0.9 % injection 5-40 mL    0.9 % sodium chloride infusion    OR Linked Order Group     potassium chloride (KLOR-CON M) extended release tablet 40 mEq     potassium bicarb-citric acid (EFFER-K) effervescent tablet 40 mEq     potassium chloride 10 mEq/100 mL IVPB (Peripheral Line)    DISCONTD: potassium chloride 10 mEq/100 mL IVPB (Peripheral Line)    magnesium sulfate 2000 mg in 50 mL IVPB premix    OR Linked Order Group     ondansetron (ZOFRAN-ODT) disintegrating tablet 4 mg     ondansetron (ZOFRAN) injection 4 mg    polyethylene glycol (GLYCOLAX) packet 17 g    bisacodyl (DULCOLAX) suppository 10 mg    famotidine (PEPCID) tablet 10 mg    aluminum & magnesium hydroxide-simethicone (MAALOX) 200-200-20 MG/5ML suspension 30 mL    OR Linked Order Group     acetaminophen (TYLENOL) tablet 650 mg     acetaminophen (TYLENOL) suppository 650 mg    heparin (porcine) injection 5,000 Units    hydrALAZINE (APRESOLINE) tablet 25 mg    LORazepam (ATIVAN) injection 2 mg    0.9 % sodium chloride infusion    glucose chewable tablet 16 g    OR Linked Order Group     dextrose bolus 10% 125 mL     dextrose bolus 10% 250 mL    glucagon (rDNA) injection 1 mg    dextrose 10 % infusion    insulin glargine (LANTUS) injection vial 10 Units         Signed:  Yanick Flynn MD    Part of this note may have been written by using a voice dictation software. The note has been proof read but may still contain some grammatical/other typographical errors.

## 2023-03-04 NOTE — ED TRIAGE NOTES
Pt arrives via POV with complaints of SOB for the last 3 days. Nothing makes it better or worse. Took benadryl to help. Didn't help. Pt A&Ox4. Unable to sit still in triage. Pt denies drug use.

## 2023-03-04 NOTE — PROGRESS NOTES
TRANSFER - IN REPORT:    Verbal report received from JOSE Petty on Inna Sanchez  being received from ED  for routine progression of patient care      Report consisted of patient's Situation, Background, Assessment and   Recommendations(SBAR). Information from the following report(s) ED Encounter Summary, ED SBAR, and MAR was reviewed with the receiving nurse. Opportunity for questions and clarification was provided. Assessment completed upon patient's arrival to unit and care assumed. SBAR received and given to primary receiving RNEnid. Patient is NOT on 8th @ transfer in time.

## 2023-03-05 LAB
ANION GAP SERPL CALC-SCNC: 4 MMOL/L (ref 2–11)
BASOPHILS # BLD: 0.1 K/UL (ref 0–0.2)
BASOPHILS NFR BLD: 1 % (ref 0–2)
BUN SERPL-MCNC: 14 MG/DL (ref 6–23)
CALCIUM SERPL-MCNC: 8.5 MG/DL (ref 8.3–10.4)
CHLORIDE SERPL-SCNC: 106 MMOL/L (ref 101–110)
CO2 SERPL-SCNC: 26 MMOL/L (ref 21–32)
CREAT SERPL-MCNC: 1.2 MG/DL (ref 0.8–1.5)
CREAT UR-MCNC: 123 MG/DL
DIFFERENTIAL METHOD BLD: ABNORMAL
EOSINOPHIL # BLD: 0.5 K/UL (ref 0–0.8)
EOSINOPHIL NFR BLD: 5 % (ref 0.5–7.8)
ERYTHROCYTE [DISTWIDTH] IN BLOOD BY AUTOMATED COUNT: 13.7 % (ref 11.9–14.6)
EST. AVERAGE GLUCOSE BLD GHB EST-MCNC: ABNORMAL MG/DL
GLUCOSE BLD STRIP.AUTO-MCNC: 222 MG/DL (ref 65–100)
GLUCOSE BLD STRIP.AUTO-MCNC: 232 MG/DL (ref 65–100)
GLUCOSE BLD STRIP.AUTO-MCNC: 317 MG/DL (ref 65–100)
GLUCOSE BLD STRIP.AUTO-MCNC: 347 MG/DL (ref 65–100)
GLUCOSE SERPL-MCNC: 261 MG/DL (ref 65–100)
HBA1C MFR BLD: >14 % (ref 4.8–5.6)
HCT VFR BLD AUTO: 37.7 % (ref 41.1–50.3)
HGB BLD-MCNC: 12.7 G/DL (ref 13.6–17.2)
IMM GRANULOCYTES # BLD AUTO: 0 K/UL (ref 0–0.5)
IMM GRANULOCYTES NFR BLD AUTO: 0 % (ref 0–5)
LYMPHOCYTES # BLD: 3 K/UL (ref 0.5–4.6)
LYMPHOCYTES NFR BLD: 32 % (ref 13–44)
MCH RBC QN AUTO: 28.6 PG (ref 26.1–32.9)
MCHC RBC AUTO-ENTMCNC: 33.7 G/DL (ref 31.4–35)
MCV RBC AUTO: 84.9 FL (ref 82–102)
MONOCYTES # BLD: 1 K/UL (ref 0.1–1.3)
MONOCYTES NFR BLD: 10 % (ref 4–12)
NEUTS SEG # BLD: 4.9 K/UL (ref 1.7–8.2)
NEUTS SEG NFR BLD: 52 % (ref 43–78)
NRBC # BLD: 0 K/UL (ref 0–0.2)
PLATELET # BLD AUTO: 273 K/UL (ref 150–450)
PMV BLD AUTO: 11.5 FL (ref 9.4–12.3)
POTASSIUM SERPL-SCNC: 3.8 MMOL/L (ref 3.5–5.1)
PROT UR-MCNC: 29 MG/DL
PROT/CREAT UR-RTO: 0.2
RBC # BLD AUTO: 4.44 M/UL (ref 4.23–5.6)
SERVICE CMNT-IMP: ABNORMAL
SODIUM SERPL-SCNC: 136 MMOL/L (ref 133–143)
SODIUM UR-SCNC: 120 MMOL/L
WBC # BLD AUTO: 9.5 K/UL (ref 4.3–11.1)

## 2023-03-05 PROCEDURE — 84300 ASSAY OF URINE SODIUM: CPT

## 2023-03-05 PROCEDURE — 6370000000 HC RX 637 (ALT 250 FOR IP): Performed by: INTERNAL MEDICINE

## 2023-03-05 PROCEDURE — 6360000002 HC RX W HCPCS: Performed by: INTERNAL MEDICINE

## 2023-03-05 PROCEDURE — 80048 BASIC METABOLIC PNL TOTAL CA: CPT

## 2023-03-05 PROCEDURE — 82962 GLUCOSE BLOOD TEST: CPT

## 2023-03-05 PROCEDURE — 6370000000 HC RX 637 (ALT 250 FOR IP): Performed by: NURSE PRACTITIONER

## 2023-03-05 PROCEDURE — 82570 ASSAY OF URINE CREATININE: CPT

## 2023-03-05 PROCEDURE — 1100000000 HC RM PRIVATE

## 2023-03-05 PROCEDURE — 85025 COMPLETE CBC W/AUTO DIFF WBC: CPT

## 2023-03-05 PROCEDURE — 2580000003 HC RX 258: Performed by: INTERNAL MEDICINE

## 2023-03-05 PROCEDURE — 36415 COLL VENOUS BLD VENIPUNCTURE: CPT

## 2023-03-05 RX ORDER — AMLODIPINE BESYLATE 5 MG/1
5 TABLET ORAL DAILY
Status: DISCONTINUED | OUTPATIENT
Start: 2023-03-05 | End: 2023-03-06 | Stop reason: HOSPADM

## 2023-03-05 RX ORDER — HYDRALAZINE HYDROCHLORIDE 20 MG/ML
10 INJECTION INTRAMUSCULAR; INTRAVENOUS ONCE
Status: COMPLETED | OUTPATIENT
Start: 2023-03-05 | End: 2023-03-05

## 2023-03-05 RX ORDER — INSULIN GLARGINE 100 [IU]/ML
15 INJECTION, SOLUTION SUBCUTANEOUS NIGHTLY
Status: DISCONTINUED | OUTPATIENT
Start: 2023-03-05 | End: 2023-03-06

## 2023-03-05 RX ADMIN — LORAZEPAM 2 MG: 2 INJECTION INTRAMUSCULAR; INTRAVENOUS at 21:42

## 2023-03-05 RX ADMIN — HYDRALAZINE HYDROCHLORIDE 10 MG: 20 INJECTION INTRAMUSCULAR; INTRAVENOUS at 17:35

## 2023-03-05 RX ADMIN — INSULIN LISPRO 10 UNITS: 100 INJECTION, SOLUTION INTRAVENOUS; SUBCUTANEOUS at 12:27

## 2023-03-05 RX ADMIN — SODIUM CHLORIDE: 9 INJECTION, SOLUTION INTRAVENOUS at 21:38

## 2023-03-05 RX ADMIN — LORAZEPAM 2 MG: 2 INJECTION INTRAMUSCULAR; INTRAVENOUS at 15:41

## 2023-03-05 RX ADMIN — HEPARIN SODIUM 5000 UNITS: 5000 INJECTION INTRAVENOUS; SUBCUTANEOUS at 21:33

## 2023-03-05 RX ADMIN — SODIUM CHLORIDE, PRESERVATIVE FREE 10 ML: 5 INJECTION INTRAVENOUS at 21:34

## 2023-03-05 RX ADMIN — INSULIN LISPRO 6 UNITS: 100 INJECTION, SOLUTION INTRAVENOUS; SUBCUTANEOUS at 08:57

## 2023-03-05 RX ADMIN — HEPARIN SODIUM 5000 UNITS: 5000 INJECTION INTRAVENOUS; SUBCUTANEOUS at 14:39

## 2023-03-05 RX ADMIN — HEPARIN SODIUM 5000 UNITS: 5000 INJECTION INTRAVENOUS; SUBCUTANEOUS at 06:50

## 2023-03-05 RX ADMIN — INSULIN LISPRO 8 UNITS: 100 INJECTION, SOLUTION INTRAVENOUS; SUBCUTANEOUS at 21:31

## 2023-03-05 RX ADMIN — INSULIN LISPRO 4 UNITS: 100 INJECTION, SOLUTION INTRAVENOUS; SUBCUTANEOUS at 17:35

## 2023-03-05 RX ADMIN — SODIUM CHLORIDE: 9 INJECTION, SOLUTION INTRAVENOUS at 11:36

## 2023-03-05 RX ADMIN — INSULIN GLARGINE 15 UNITS: 100 INJECTION, SOLUTION SUBCUTANEOUS at 21:31

## 2023-03-05 RX ADMIN — SODIUM CHLORIDE, PRESERVATIVE FREE 10 ML: 5 INJECTION INTRAVENOUS at 08:58

## 2023-03-05 RX ADMIN — SODIUM CHLORIDE: 9 INJECTION, SOLUTION INTRAVENOUS at 01:27

## 2023-03-05 RX ADMIN — HYDRALAZINE HYDROCHLORIDE 25 MG: 25 TABLET, FILM COATED ORAL at 15:41

## 2023-03-05 RX ADMIN — LORAZEPAM 2 MG: 2 INJECTION INTRAMUSCULAR; INTRAVENOUS at 08:58

## 2023-03-05 RX ADMIN — AMLODIPINE BESYLATE 5 MG: 5 TABLET ORAL at 10:26

## 2023-03-05 ASSESSMENT — PAIN SCALES - GENERAL
PAINLEVEL_OUTOF10: 0
PAINLEVEL_OUTOF10: 0

## 2023-03-05 NOTE — PROGRESS NOTES
Pt /104 after PO PRN hydralazine. Dr. Lees Left on call and notified via perfect serve. One time dose 10mg IV Hydralazine ordered. Will monitor.

## 2023-03-05 NOTE — PROGRESS NOTES
Pt resting in bed awake. Alert and oriented times 3 at this time. On RA. No s/sx of distress noted. NS infusing at 100ml/hr. Denies any pain at this time. Encouraged to call for assistance as needed. Call light within reach. Will continue to monitor.

## 2023-03-05 NOTE — PROGRESS NOTES
Hospitalist Progress Note   Admit Date:  3/4/2023  3:07 AM   Name:  Trish Elmoer   Age:  50 y.o. Sex:  male  :  1970   MRN:  [id number]   Room:  [facility]    Presenting Complaint: Shortness of Breath     Reason(s) for Admission: OMAR (acute kidney injury) ([facility] [N17.9]  Diabetes mellitus, new onset ([facility] [E11.9]  Elevated lipase [R74.8]     Hospital Course:   Please see admitting H&P for details of presentation. In brief, Trish Elmore is a 50 y.o. AAM with no reported PMH who presented to the ER with shortness of breath for past 3 days. He was found with kidney injury, electrolyte derangements, hyperglycemia, and leukocytosis. He does report EtOH use but stated his last drink was on . He also admitted to marijuana use. UDS was positive for amphetamine, cocaine, and THC. UA and CXR were negative. COVID-negative. He was admitted to the hospitalist service for further work-up. A1c >14. Insulin started and diabetes educator consulted. Subjective & 24hr Events (23): The patient is sitting on the edge of the bed eating his breakfast.  Afebrile. OMAR and leukocytosis resolved. Noted hypertensive trend and will start agent today; patient informed and agrees. I asked the patient if he knew he was a diabetic and he responded \"I yassine knew\". He can likely be discharged tomorrow after diabetes education and assuming his prescriptions can be obtained.     Assessment & Plan:     OMAR   - Does report poor PO in last several days  - Resolved s/p IVF  - Renal US w/o obstruction     Hyperglycemia due to T2DM, undiagnosed  - A1c > 14  - Increase Lantus to 15 U qHS on Mar/05  - Correctional lispro  - Diabetic educator consulted     HTN  - Start amlodipine 5 mg daily  - Avoid BB w/ cocaine abuse    Hyponatremia, likely pseudohyponatremia from hyperglycemia  - Resolved     Polysubstance abuse  - alcohol as well as UDS positive for amphetamine / THC / cocaine  - Ativan prn for withdrawal/agitations. Reports last alcohol use 2/28. PT/OT evals and PPD needed/ordered? No  Diet:  ADULT DIET; Regular  ADULT ORAL NUTRITION SUPPLEMENT; Dinner; Low Calorie/High Protein Oral Supplement  VTE prophylaxis: Heparin  Code status: Full Code    Hospital Problems:  Principal Problem:    OMAR (acute kidney injury) ([facility]  Active Problems:    Pseudohyponatremia    Hyperglycemia due to diabetes mellitus ([facility]    Polysubstance abuse ([facility]  Resolved Problems:    * No resolved hospital problems. *      Objective:   Patient Vitals for the past 24 hrs:   Temp Pulse Resp BP SpO2   03/05/23 0751 98.1 °F (36.7 °C) 87 20 (!) 170/108 97 %   03/04/23 2349 -- 84 -- (!) 157/111 --   03/04/23 2337 98.8 °F (37.1 °C) 84 18 (!) 157/111 97 %   03/04/23 2111 -- -- -- (!) 150/108 --   03/04/23 1922 97.7 °F (36.5 °C) 99 18 (!) 148/107 98 %   03/04/23 1521 98.2 °F (36.8 °C) 88 20 (!) 164/115 94 %   03/04/23 1152 97.7 °F (36.5 °C) 84 20 128/85 98 %       Oxygen Therapy  SpO2: 97 %  Pulse Oximetry Type: Continuous  Pulse via Oximetry: 87 beats per minute  Pulse Oximeter Device Mode: Continuous  O2 Device: None (Room air)    Estimated body mass index is 29.84 kg/m² as calculated from the following:    Height as of this encounter: 6' (1.829 m). Weight as of this encounter: 220 lb (99.8 kg). Intake/Output Summary (Last 24 hours) at 3/5/2023 0909  Last data filed at 3/5/2023 0352  Gross per 24 hour   Intake --   Output 1250 ml   Net -1250 ml         Physical Exam:   Blood pressure (!) 170/108, pulse 87, temperature 98.1 °F (36.7 °C), temperature source Oral, resp. rate 20, height 6' (1.829 m), weight 220 lb (99.8 kg), SpO2 97 %. General:    No cardiopulmonary distress  Head:  Normocephalic, atraumatic  Eyes:  Sclerae appear normal.  Pupils equally round. ENT:  Nares appear normal.  Moist oral mucosa  Neck:  No restricted ROM. Trachea midline   CV:   RRR. No m/r/g. Lungs: No wheezing. Unlabored on room air.   Abdomen: Soft, nondistended. Extremities: No cyanosis or clubbing. Skin:     No rashes and normal coloration. Warm and dry.     Neuro:  Alert and oriented  Psych:  Impulsive    I have personally reviewed labs and tests:  Recent Labs:  Recent Results (from the past 48 hour(s))   CBC with Auto Differential    Collection Time: 03/04/23  2:49 AM   Result Value Ref Range    WBC 13.9 (H) 4.3 - 11.1 K/uL    RBC 4.91 4.23 - 5.6 M/uL    Hemoglobin 14.0 13.6 - 17.2 g/dL    Hematocrit 41.0 (L) 41.1 - 50.3 %    MCV 83.5 82 - 102 FL    MCH 28.5 26.1 - 32.9 PG    MCHC 34.1 31.4 - 35.0 g/dL    RDW 13.4 11.9 - 14.6 %    Platelets 058 916 - 047 K/uL    MPV 12.1 9.4 - 12.3 FL    nRBC 0.00 0.0 - 0.2 K/uL    Differential Type AUTOMATED      Seg Neutrophils 42 (L) 43 - 78 %    Lymphocytes 42 13 - 44 %    Monocytes 12 4.0 - 12.0 %    Eosinophils % 4 0.5 - 7.8 %    Basophils 1 0.0 - 2.0 %    Immature Granulocytes 0 0.0 - 5.0 %    Segs Absolute 5.8 1.7 - 8.2 K/UL    Absolute Lymph # 5.8 (H) 0.5 - 4.6 K/UL    Absolute Mono # 1.6 (H) 0.1 - 1.3 K/UL    Absolute Eos # 0.6 0.0 - 0.8 K/UL    Basophils Absolute 0.1 0.0 - 0.2 K/UL    Absolute Immature Granulocyte 0.1 0.0 - 0.5 K/UL   Comprehensive Metabolic Panel    Collection Time: 03/04/23  2:49 AM   Result Value Ref Range    Sodium 130 (L) 133 - 143 mmol/L    Potassium 3.6 3.5 - 5.1 mmol/L    Chloride 101 101 - 110 mmol/L    CO2 21 21 - 32 mmol/L    Anion Gap 8 2 - 11 mmol/L    Glucose 441 (H) 65 - 100 mg/dL    BUN 23 6 - 23 MG/DL    Creatinine 2.00 (H) 0.8 - 1.5 MG/DL    Est, Glom Filt Rate 40 (L) >60 ml/min/1.73m2    Calcium 9.4 8.3 - 10.4 MG/DL    Total Bilirubin 0.6 0.2 - 1.1 MG/DL    ALT 41 12 - 65 U/L    AST 62 (H) 15 - 37 U/L    Alk Phosphatase 118 50 - 136 U/L    Total Protein 8.1 6.3 - 8.2 g/dL    Albumin 3.4 (L) 3.5 - 5.0 g/dL    Globulin 4.7 (H) 2.8 - 4.5 g/dL    Albumin/Globulin Ratio 0.7 0.4 - 1.6     Lipase    Collection Time: 03/04/23  2:49 AM   Result Value Ref Range    Lipase 594 (H) 73 - 393 U/L   Hemoglobin A1C    Collection Time: 03/04/23  2:49 AM   Result Value Ref Range    Hemoglobin A1C >14.0 (H) 4.8 - 5.6 %    eAG Cannot be calculated mg/dL   POCT Glucose    Collection Time: 03/04/23  4:23 AM   Result Value Ref Range    POC Glucose 423 (H) 65 - 100 mg/dL    Performed by: Lesa Garg    COVID-19, Rapid    Collection Time: 03/04/23  4:26 AM    Specimen: Nasopharyngeal   Result Value Ref Range    Source NASAL      SARS-CoV-2, Rapid Not detected NOTD     Influenza A/B, Molecular    Collection Time: 03/04/23  4:26 AM    Specimen: Not Specified   Result Value Ref Range    Influenza A, DANELLE Not detected NOTD      Influenza B, DANELLE Not detected NOTD     POCT Glucose    Collection Time: 03/04/23  5:34 AM   Result Value Ref Range    POC Glucose 367 (H) 65 - 100 mg/dL    Performed by: Annie    Drug Screen Psych, Urine    Collection Time: 03/04/23  6:10 AM   Result Value Ref Range    Benzodiazepines, Urine Negative NEG      Opiates, Urine Negative NEG      Amphetamine, Urine Positive (A) NEG      Cocaine, Urine Positive (A) NEG      THC, TH-Cannabinol, Urine Positive (A) NEG     Urinalysis    Collection Time: 03/04/23  6:10 AM   Result Value Ref Range    Color, UA YELLOW/STRAW      Appearance CLEAR      Specific Gravity, UA 1.034 (H) 1.001 - 1.023      pH, Urine 5.0 5.0 - 9.0      Protein, UA TRACE (A) NEG mg/dL    Glucose,  mg/dL    Ketones, Urine TRACE (A) NEG mg/dL    Bilirubin Urine Negative NEG      Blood, Urine MODERATE (A) NEG      Urobilinogen, Urine 0.2 0.2 - 1.0 EU/dL    Nitrite, Urine Negative NEG      Leukocyte Esterase, Urine Negative NEG     Urinalysis, Micro    Collection Time: 03/04/23  6:10 AM   Result Value Ref Range    WBC, UA 0-3 0 /hpf    RBC, UA 0-3 0 /hpf    Epithelial Cells UA 0-3 0 /hpf    BACTERIA, URINE 0 0 /hpf    Casts 0 0 /lpf    Crystals 0 0 /LPF    Mucus, UA 0 0 /lpf    Other observations RESULTS VERIFIED MANUALLY     POCT Glucose    Collection Time: 03/04/23  4:26 PM   Result Value Ref Range    POC Glucose 378 (H) 65 - 100 mg/dL    Performed by: Olvin Lorenzo    Hemoglobin A1c    Collection Time: 03/04/23  5:47 PM   Result Value Ref Range    Hemoglobin A1C >14.0 (H) 4.8 - 5.6 %    eAG Cannot be calculated mg/dL   POCT Glucose    Collection Time: 03/04/23  8:45 PM   Result Value Ref Range    POC Glucose 276 (H) 65 - 100 mg/dL    Performed by: Renetta    Basic Metabolic Panel w/ Reflex to MG    Collection Time: 03/05/23  4:06 AM   Result Value Ref Range    Sodium 136 133 - 143 mmol/L    Potassium 3.8 3.5 - 5.1 mmol/L    Chloride 106 101 - 110 mmol/L    CO2 26 21 - 32 mmol/L    Anion Gap 4 2 - 11 mmol/L    Glucose 261 (H) 65 - 100 mg/dL    BUN 14 6 - 23 MG/DL    Creatinine 1.20 0.8 - 1.5 MG/DL    Est, Glom Filt Rate >60 >60 ml/min/1.73m2    Calcium 8.5 8.3 - 10.4 MG/DL   CBC with Auto Differential    Collection Time: 03/05/23  4:06 AM   Result Value Ref Range    WBC 9.5 4.3 - 11.1 K/uL    RBC 4.44 4.23 - 5.6 M/uL    Hemoglobin 12.7 (L) 13.6 - 17.2 g/dL    Hematocrit 37.7 (L) 41.1 - 50.3 %    MCV 84.9 82 - 102 FL    MCH 28.6 26.1 - 32.9 PG    MCHC 33.7 31.4 - 35.0 g/dL    RDW 13.7 11.9 - 14.6 %    Platelets 547 800 - 543 K/uL    MPV 11.5 9.4 - 12.3 FL    nRBC 0.00 0.0 - 0.2 K/uL    Differential Type AUTOMATED      Seg Neutrophils 52 43 - 78 %    Lymphocytes 32 13 - 44 %    Monocytes 10 4.0 - 12.0 %    Eosinophils % 5 0.5 - 7.8 %    Basophils 1 0.0 - 2.0 %    Immature Granulocytes 0 0.0 - 5.0 %    Segs Absolute 4.9 1.7 - 8.2 K/UL    Absolute Lymph # 3.0 0.5 - 4.6 K/UL    Absolute Mono # 1.0 0.1 - 1.3 K/UL    Absolute Eos # 0.5 0.0 - 0.8 K/UL    Basophils Absolute 0.1 0.0 - 0.2 K/UL    Absolute Immature Granulocyte 0.0 0.0 - 0.5 K/UL   Protein / creatinine ratio, urine    Collection Time: 03/05/23  4:13 AM   Result Value Ref Range    Protein, Urine, Random 29 (H) <11.9 mg/dL    Creatinine, Ur 123.00 mg/dL    PROTEIN/CREAT RATIO URINE RAN 0.2     Sodium, urine, random    Collection Time: 03/05/23  4:13 AM   Result Value Ref Range    SODIUM, RANDOM URINE 120 MMOL/L   POCT Glucose    Collection Time: 03/05/23  7:52 AM   Result Value Ref Range    POC Glucose 222 (H) 65 - 100 mg/dL    Performed by: MCKENZIE Medina 38        I have personally reviewed imaging studies:  Other Studies:  US RETROPERITONEAL LIMITED   Final Result   No acute findings. No evidence of renal obstruction. XR CHEST PORTABLE   Final Result      1. Minor cardiomegaly. No overt failure.                 Keira Moura M.D.    3/4/2023 6:03:00 AM          Current Meds:  Current Facility-Administered Medications   Medication Dose Route Frequency    0.9 % sodium chloride bolus  1,000 mL IntraVENous Once    sodium chloride flush 0.9 % injection 5-40 mL  5-40 mL IntraVENous 2 times per day    sodium chloride flush 0.9 % injection 5-40 mL  5-40 mL IntraVENous PRN    0.9 % sodium chloride infusion   IntraVENous PRN    ondansetron (ZOFRAN-ODT) disintegrating tablet 4 mg  4 mg Oral Q8H PRN    Or    ondansetron (ZOFRAN) injection 4 mg  4 mg IntraVENous Q6H PRN    polyethylene glycol (GLYCOLAX) packet 17 g  17 g Oral Daily PRN    bisacodyl (DULCOLAX) suppository 10 mg  10 mg Rectal Daily PRN    famotidine (PEPCID) tablet 10 mg  10 mg Oral Daily PRN    aluminum & magnesium hydroxide-simethicone (MAALOX) 200-200-20 MG/5ML suspension 30 mL  30 mL Oral Q6H PRN    acetaminophen (TYLENOL) tablet 650 mg  650 mg Oral Q6H PRN    Or    acetaminophen (TYLENOL) suppository 650 mg  650 mg Rectal Q6H PRN    heparin (porcine) injection 5,000 Units  5,000 Units SubCUTAneous 3 times per day    hydrALAZINE (APRESOLINE) tablet 25 mg  25 mg Oral Q8H PRN    LORazepam (ATIVAN) injection 2 mg  2 mg IntraVENous Q6H PRN    0.9 % sodium chloride infusion   IntraVENous Continuous    glucose chewable tablet 16 g  4 tablet Oral PRN    dextrose bolus 10% 125 mL  125 mL IntraVENous PRN    Or    dextrose bolus 10% 250 mL  250 mL IntraVENous PRN    glucagon (rDNA) injection 1 mg  1 mg SubCUTAneous PRN    dextrose 10 % infusion   IntraVENous Continuous PRN    insulin glargine (LANTUS) injection vial 10 Units  10 Units SubCUTAneous Nightly    insulin lispro (HUMALOG) injection vial 0-10 Units  0-10 Units SubCUTAneous 4x Daily AC & HS       Signed:  Bennie Mcgrath, APRN - CNP    Part of this note may have been written by using a voice dictation software. The note has been proof read but may still contain some grammatical/other typographical errors.

## 2023-03-05 NOTE — FLOWSHEET NOTE
03/05/23 0030   CIWA-Ar   Nausea and Vomiting 0   Tactile Disturbances 0   Tremor 0   Auditory Disturbances 0   Paroxysmal Sweats 0   Visual Disturbances 0   Anxiety 0   Headache, Fullness in Head 0   Agitation 0   Orientation and Clouding of Sensorium 0   CIWA-Ar Total 0     Patient resting quietly and still with eyes closed. Ativan effective.

## 2023-03-05 NOTE — PROGRESS NOTES
25mg PO hydralazine given for BP of 164/108. 2mg Lorazepam given for anxiety/restlessness. Will monitor.

## 2023-03-05 NOTE — FLOWSHEET NOTE
03/04/23 1922   Vital Signs   Temp 97.7 °F (36.5 °C)   Temp Source Oral   Heart Rate 99   Heart Rate Source Monitor   Resp 18   BP (!) 148/107   MAP (Calculated) 121   MAP (mmHg) 122   BP Location Right upper arm   Patient Position Supine   Level of Consciousness 0   MEWS Score 1   Pain Assessment   Pain Assessment None - Denies Pain   Pain Level 0   Oxygen Therapy   SpO2 98 %   O2 Device None (Room air)     Hydralazine 25mg PO given for /107

## 2023-03-05 NOTE — PROGRESS NOTES
/108 one hour after hydralazine PO given. Patient denies headache, chest pain, or any other complaints. Dr. Diamante Macdonald notified via perfect serve. No orders received. Will continue to monitor.

## 2023-03-05 NOTE — PROGRESS NOTES
Patient has elevated BP despite being given prn hydralazine. Patient is restless, flailing and flopping in bed. Cannot be still. States he is very anxious. Scores 9 on ciwa. Ativan 2mg IV given.

## 2023-03-06 VITALS
HEIGHT: 72 IN | TEMPERATURE: 98.4 F | WEIGHT: 220 LBS | SYSTOLIC BLOOD PRESSURE: 156 MMHG | BODY MASS INDEX: 29.8 KG/M2 | HEART RATE: 83 BPM | OXYGEN SATURATION: 98 % | DIASTOLIC BLOOD PRESSURE: 98 MMHG | RESPIRATION RATE: 14 BRPM

## 2023-03-06 PROBLEM — R79.89 PSEUDOHYPONATREMIA: Status: RESOLVED | Noted: 2023-03-04 | Resolved: 2023-03-06

## 2023-03-06 PROBLEM — N17.9 AKI (ACUTE KIDNEY INJURY) (HCC): Status: RESOLVED | Noted: 2023-03-04 | Resolved: 2023-03-06

## 2023-03-06 LAB
ANION GAP SERPL CALC-SCNC: 6 MMOL/L (ref 2–11)
BUN SERPL-MCNC: 12 MG/DL (ref 6–23)
CALCIUM SERPL-MCNC: 8.6 MG/DL (ref 8.3–10.4)
CHLORIDE SERPL-SCNC: 103 MMOL/L (ref 101–110)
CO2 SERPL-SCNC: 25 MMOL/L (ref 21–32)
CREAT SERPL-MCNC: 1.3 MG/DL (ref 0.8–1.5)
GLUCOSE BLD STRIP.AUTO-MCNC: 267 MG/DL (ref 65–100)
GLUCOSE BLD STRIP.AUTO-MCNC: 374 MG/DL (ref 65–100)
GLUCOSE SERPL-MCNC: 369 MG/DL (ref 65–100)
MAGNESIUM SERPL-MCNC: 1.8 MG/DL (ref 1.8–2.4)
POTASSIUM SERPL-SCNC: 3.5 MMOL/L (ref 3.5–5.1)
SERVICE CMNT-IMP: ABNORMAL
SERVICE CMNT-IMP: ABNORMAL
SODIUM SERPL-SCNC: 134 MMOL/L (ref 133–143)

## 2023-03-06 PROCEDURE — 6360000002 HC RX W HCPCS: Performed by: INTERNAL MEDICINE

## 2023-03-06 PROCEDURE — 36415 COLL VENOUS BLD VENIPUNCTURE: CPT

## 2023-03-06 PROCEDURE — 96376 TX/PRO/DX INJ SAME DRUG ADON: CPT

## 2023-03-06 PROCEDURE — 82962 GLUCOSE BLOOD TEST: CPT

## 2023-03-06 PROCEDURE — 6370000000 HC RX 637 (ALT 250 FOR IP): Performed by: HOSPITALIST

## 2023-03-06 PROCEDURE — 6370000000 HC RX 637 (ALT 250 FOR IP): Performed by: INTERNAL MEDICINE

## 2023-03-06 PROCEDURE — 83735 ASSAY OF MAGNESIUM: CPT

## 2023-03-06 PROCEDURE — 6370000000 HC RX 637 (ALT 250 FOR IP): Performed by: NURSE PRACTITIONER

## 2023-03-06 PROCEDURE — 80048 BASIC METABOLIC PNL TOTAL CA: CPT

## 2023-03-06 PROCEDURE — G0378 HOSPITAL OBSERVATION PER HR: HCPCS

## 2023-03-06 RX ORDER — INSULIN LISPRO 100 [IU]/ML
0.08 INJECTION, SOLUTION INTRAVENOUS; SUBCUTANEOUS
Status: DISCONTINUED | OUTPATIENT
Start: 2023-03-06 | End: 2023-03-06 | Stop reason: HOSPADM

## 2023-03-06 RX ORDER — INSULIN LISPRO 200 [IU]/ML
8 INJECTION, SOLUTION SUBCUTANEOUS
Qty: 2 ADJUSTABLE DOSE PRE-FILLED PEN SYRINGE | Refills: 0 | Status: SHIPPED | OUTPATIENT
Start: 2023-03-06

## 2023-03-06 RX ORDER — INSULIN GLARGINE 100 [IU]/ML
0.25 INJECTION, SOLUTION SUBCUTANEOUS NIGHTLY
Status: DISCONTINUED | OUTPATIENT
Start: 2023-03-06 | End: 2023-03-06 | Stop reason: HOSPADM

## 2023-03-06 RX ORDER — CLONIDINE HYDROCHLORIDE 0.2 MG/1
0.1 TABLET ORAL ONCE
Status: COMPLETED | OUTPATIENT
Start: 2023-03-06 | End: 2023-03-06

## 2023-03-06 RX ORDER — AMLODIPINE BESYLATE 5 MG/1
5 TABLET ORAL DAILY
Qty: 30 TABLET | Refills: 0 | Status: SHIPPED | OUTPATIENT
Start: 2023-03-07

## 2023-03-06 RX ORDER — BLOOD-GLUCOSE METER
1 KIT MISCELLANEOUS
Qty: 1 EACH | Refills: 0 | Status: SHIPPED | OUTPATIENT
Start: 2023-03-06

## 2023-03-06 RX ORDER — PEN NEEDLE, DIABETIC 31 GX5/16"
1 NEEDLE, DISPOSABLE MISCELLANEOUS
Qty: 100 EACH | Refills: 1 | Status: SHIPPED | OUTPATIENT
Start: 2023-03-06

## 2023-03-06 RX ORDER — GLUCOSAMINE HCL/CHONDROITIN SU 500-400 MG
CAPSULE ORAL
Qty: 100 STRIP | Refills: 1 | Status: SHIPPED | OUTPATIENT
Start: 2023-03-06 | End: 2023-04-05

## 2023-03-06 RX ADMIN — AMLODIPINE BESYLATE 5 MG: 5 TABLET ORAL at 09:44

## 2023-03-06 RX ADMIN — HEPARIN SODIUM 5000 UNITS: 5000 INJECTION INTRAVENOUS; SUBCUTANEOUS at 05:22

## 2023-03-06 RX ADMIN — CLONIDINE HYDROCHLORIDE 0.1 MG: 0.2 TABLET ORAL at 01:29

## 2023-03-06 RX ADMIN — LORAZEPAM 2 MG: 2 INJECTION INTRAMUSCULAR; INTRAVENOUS at 09:43

## 2023-03-06 RX ADMIN — HYDRALAZINE HYDROCHLORIDE 25 MG: 25 TABLET, FILM COATED ORAL at 00:04

## 2023-03-06 ASSESSMENT — PAIN SCALES - GENERAL: PAINLEVEL_OUTOF10: 0

## 2023-03-06 NOTE — CASE COMMUNICATION
[address] [facility]    March 6, 2023       RE: Ryan Mckeon      To Whom It May Concern,    This is to certify that Ryan Mckeon was admitted into the hospital on 3/4/2023 and discharged home 3/6/2023. Please feel free to contact my office at the hospital ([phone]) if you have any questions or concerns. Thank you for your assistance in this matter.     Sincerely,  Waylan Kanner, APRN - CNP

## 2023-03-06 NOTE — DISCHARGE SUMMARY
Hospitalist Discharge Summary   Admit Date:  3/4/2023  3:07 AM   DC Note date: 3/6/2023  Name:  Lv Spencer   Age:  50 y.o. Sex:  male  :  1970   MRN:  [id number]   Room:  [facility]  PCP:  None None    Presenting Complaint: Shortness of Breath     Initial Admission Diagnosis: OMAR (acute kidney injury) ([facility] [address] [N17.9]  Diabetes mellitus, new onset ([facility] [address] [E11.9]  Elevated lipase [R74.8]     Problem List for this Hospitalization (present on admission):    Principal Problem (Resolved):    OMAR (acute kidney injury) ([facility] [address]  Active Problems:    Hyperglycemia due to diabetes mellitus ([facility] [address]    Polysubstance abuse [facility])  Resolved Problems:    Pseudohyponatremia      Hospital Course:  Lv Spencer is a 50 y.o. AAM with no reported PMH who presented to the ER with shortness of breath for past 3 days. He was found with kidney injury, electrolyte derangements, hyperglycemia, and leukocytosis. He does report EtOH use but stated his last drink was on . He also admitted to marijuana use. UDS was positive for amphetamine, cocaine, and THC. UA and CXR were negative. COVID-negative. He was admitted to the hospitalist service for further work-up. Hemoglobin A1C > 14.0, new onset diabetes mellitus. Placed on basal, prandial, and sliding scale insulin. Diabetic educator provided diabetes and insulin education. OMAR, leukocytosis, and electrolytes have normalized. Renal US w/o obstruction, kidney function normalized with IVF. Respiratory status stable on RA at discharge. Encourage cessation from etoh and all illicit substances. Encourage diet and lifestyle modifications to promote weight loss. CM will place referral for new PCP on discharge. Referral for diabetic education placed as well. Initiated on amlodipine for persistent hypertension. Disposition: Home  Diet: ADULT DIET;  Regular; 4 carb choices (60 gm/meal)  ADULT ORAL NUTRITION SUPPLEMENT; Dinner; Diabetic Oral Supplement  Code Status: Full Code    Follow Ups:  Referral placed for new PCP; will receive call to schedule. Time spent in patient discharge and coordination 35 minutes. Follow up labs/diagnostics: CBC / BMP 1 week    Plan was discussed with patient, family at bedside, care team, Dr. Jose Stafford. All questions answered. Patient was stable at time of discharge. Instructions given to call a physician or return if any concerns. Current Discharge Medication List        START taking these medications    Details   amLODIPine (NORVASC) 5 MG tablet Take 1 tablet by mouth daily  Qty: 30 tablet, Refills: 0      blood glucose monitor strips by Other route 4 times daily (before meals and nightly) Test 4 times a day & as needed for symptoms of irregular blood glucose. Dispense sufficient amount for indicated testing frequency plus additional to accommodate PRN testing needs. Pharmacist to identify preferred brand. Qty: 100 strip, Refills: 1      Lancets 30G MISC 1 each by Does not apply route 4 times daily (before meals and nightly) Test 4   times a day & as needed for symptoms of irregular blood glucose. Dispense sufficient amount for indicated testing frequency plus additional to accommodate PRN testing needs. Pharmacist to identify preferred brand. Qty: 100 each, Refills: 1      insulin glargine (LANTUS;BASAGLAR) 100 UNIT/ML injection pen Inject 25 Units into the skin nightly Okay to substitute Basaglar or Semglee or Rezvoglar.   Qty: 3 mL, Refills: 1      Blood Glucose Monitoring Suppl (FREESTYLE LITE) DIMAS 1 Device by Does not apply route 4 times daily (before meals and nightly)  Qty: 1 each, Refills: 0      insulin lispro (HUMALOG KWIKPEN) 200 UNIT/ML SOPN pen Inject 8 Units into the skin 3 times daily (with meals)  Qty: 2 Adjustable Dose Pre-filled Pen Syringe, Refills: 0      Insulin Pen Needle (B-D ULTRAFINE III SHORT PEN) 31G X 8 MM MISC 1 each by Does not apply route 4 times daily (before meals and nightly)  Qty: 100 each, Refills: 1           CONTINUE these medications which have NOT CHANGED    Details   Multiple Vitamin (MULTIVITAMIN ADULT PO) Take 1 tablet by mouth daily             Some medications may have been reported old/obsolete and marked \"stop taking\" by the system; in reality pt was already off these meds; defer to outpatient or prescribing providers. Procedures done this admission:  * No surgery found *    Consults this admission:  IP CONSULT TO DIABETES MANAGEMENT  IP CONSULT TO CASE MANAGEMENT    Echocardiogram results:  No results found for this or any previous visit. Diagnostic Imaging/Tests:   XR CHEST PORTABLE    Result Date: 3/4/2023  1. Minor cardiomegaly. No overt failure. Annika Kang M.D. 3/4/2023 6:03:00 AM    US RETROPERITONEAL LIMITED    Result Date: 3/4/2023  No acute findings. No evidence of renal obstruction.        Labs: Results:       BMP, Mg, Phos Recent Labs     03/04/23 0249 03/05/23 0406 03/06/23  0926   * 136 134   K 3.6 3.8 3.5    106 103   CO2 21 26 25   ANIONGAP 8 4 6   BUN 23 14 12   CREATININE 2.00* 1.20 1.30   LABGLOM 40* >60 >60   CALCIUM 9.4 8.5 8.6   GLUCOSE 441* 261* 369*   MG  --   --  1.8      CBC Recent Labs     03/04/23 0249 03/05/23 0406   WBC 13.9* 9.5   RBC 4.91 4.44   HGB 14.0 12.7*   HCT 41.0* 37.7*   MCV 83.5 84.9   MCH 28.5 28.6   MCHC 34.1 33.7   RDW 13.4 13.7    273   MPV 12.1 11.5   NRBC 0.00 0.00   SEGS 42* 52   LYMPHOPCT 42 32   EOSRELPCT 4 5   MONOPCT 12 10   BASOPCT 1 1   IMMGRAN 0 0   SEGSABS 5.8 4.9   LYMPHSABS 5.8* 3.0   EOSABS 0.6 0.5   MONOSABS 1.6* 1.0   BASOSABS 0.1 0.1   ABSIMMGRAN 0.1 0.0      LFT Recent Labs     03/04/23 0249   BILITOT 0.6   ALKPHOS 118   AST 62*   ALT 41   PROT 8.1   LABALBU 3.4*   GLOB 4.7*      Cardiac  No results found for: NTPROBNP, TROPHS   Coags No results found for: PROTIME, INR, APTT   A1c Lab Results   Component Value Date/Time    LABA1C >14.0 03/04/2023 05:47 PM    LABA1C >14.0 03/04/2023 02:49 AM    EAG Cannot be calculated 03/04/2023 05:47 PM    EAG Cannot be calculated 03/04/2023 02:49 AM      Lipids No results found for: CHOL, LDLCALC, LABVLDL, HDL, CHOLHDLRATIO, TRIG   Thyroid  No results found for: Katia Gleason     Most Recent UA Lab Results   Component Value Date/Time    COLORU YELLOW/STRAW 03/04/2023 06:10 AM    APPEARANCE CLEAR 03/04/2023 06:10 AM    SPECGRAV 1.034 03/04/2023 06:10 AM    LABPH 5.0 03/04/2023 06:10 AM    PROTEINU TRACE 03/04/2023 06:10 AM    GLUCOSEU 250 03/04/2023 06:10 AM    KETUA TRACE 03/04/2023 06:10 AM    BILIRUBINUR Negative 03/04/2023 06:10 AM    BLOODU MODERATE 03/04/2023 06:10 AM    UROBILINOGEN 0.2 03/04/2023 06:10 AM    NITRU Negative 03/04/2023 06:10 AM    LEUKOCYTESUR Negative 03/04/2023 06:10 AM    WBCUA 0-3 03/04/2023 06:10 AM    RBCUA 0-3 03/04/2023 06:10 AM    EPITHUA 0-3 03/04/2023 06:10 AM    BACTERIA 0 03/04/2023 06:10 AM    LABCAST 0 03/04/2023 06:10 AM    MUCUS 0 03/04/2023 06:10 AM        No results for input(s): CULTURE in the last 720 hours. All Labs from Last 24 Hrs:  Recent Results (from the past 24 hour(s))   POCT Glucose    Collection Time: 03/05/23 11:32 AM   Result Value Ref Range    POC Glucose 317 (H) 65 - 100 mg/dL    Performed by: Jil    POCT Glucose    Collection Time: 03/05/23  4:56 PM   Result Value Ref Range    POC Glucose 232 (H) 65 - 100 mg/dL    Performed by:  Jil    POCT Glucose    Collection Time: 03/05/23  8:40 PM   Result Value Ref Range    POC Glucose 347 (H) 65 - 100 mg/dL    Performed by: Keyla    POCT Glucose    Collection Time: 03/06/23  7:31 AM   Result Value Ref Range    POC Glucose 267 (H) 65 - 100 mg/dL    Performed by: Geetha    Basic Metabolic Panel w/ Reflex to MG    Collection Time: 03/06/23  9:26 AM   Result Value Ref Range    Sodium 134 133 - 143 mmol/L    Potassium 3.5 3.5 - 5.1 mmol/L    Chloride 103 101 - 110 mmol/L    CO2 25 21 - 32 mmol/L    Anion Gap 6 2 - 11 mmol/L Glucose 369 (H) 65 - 100 mg/dL    BUN 12 6 - 23 MG/DL    Creatinine 1.30 0.8 - 1.5 MG/DL    Est, Glom Filt Rate >60 >60 ml/min/1.73m2    Calcium 8.6 8.3 - 10.4 MG/DL   Magnesium    Collection Time: 03/06/23  9:26 AM   Result Value Ref Range    Magnesium 1.8 1.8 - 2.4 mg/dL       No Known Allergies    There is no immunization history on file for this patient. Recent Vital Data:  Patient Vitals for the past 24 hrs:   Temp Pulse Resp BP SpO2   03/06/23 0730 97.7 °F (36.5 °C) 80 14 (!) 146/103 97 %   03/06/23 0406 97.9 °F (36.6 °C) 80 20 (!) 142/96 97 %   03/06/23 0200 -- -- -- (!) 150/98 --   03/06/23 0100 -- -- -- (!) 167/103 --   03/05/23 2337 97.9 °F (36.6 °C) 91 21 (!) 152/105 97 %   03/05/23 1911 97.9 °F (36.6 °C) 94 19 (!) 150/100 99 %   03/05/23 1800 -- 91 -- (!) 145/92 --   03/05/23 1701 -- 86 -- (!) 161/104 --   03/05/23 1525 98.6 °F (37 °C) 87 20 (!) 164/108 97 %   03/05/23 1132 97.7 °F (36.5 °C) 86 21 (!) 139/94 98 %       Oxygen Therapy  SpO2: 97 %  Pulse Oximetry Type: Continuous  Pulse via Oximetry: 87 beats per minute  Pulse Oximeter Device Mode: Continuous  O2 Device: None (Room air)    Estimated body mass index is 29.84 kg/m² as calculated from the following:    Height as of this encounter: 6' (1.829 m). Weight as of this encounter: 220 lb (99.8 kg). Intake/Output Summary (Last 24 hours) at 3/6/2023 1128  Last data filed at 3/6/2023 1020  Gross per 24 hour   Intake 420 ml   Output 2750 ml   Net -2330 ml         Physical Exam:  General:    Alert. Obese. No acute distress  Head:  Normocephalic, atraumatic  Eyes:  Sclerae appear normal.  Pupils equally round. Glasses intact  HENT:  Nares appear normal, no drainage. Moist mucous membranes  Neck:  No restricted ROM. Trachea midline  CV:   RRR. No m/r/g. No JVD  Lungs:   CTAB. No wheezing, rhonchi, or rales. Respirations even, unlabored on RA symmetric expansion  Abdomen:   Obese, soft, nontender, nondistended.     Extremities: Warm and dry.  No cyanosis or clubbing. No edema. Skin:     No rashes. Normal coloration  Neuro:  CN II-XII grossly intact. A&O x 3. Psych:  Normal mood and affect.     Signed: Gayland Homans [facility]

## 2023-03-06 NOTE — FLOWSHEET NOTE
03/06/23 0100   Vital Signs   BP (!) 167/103   MAP (Calculated) 124   BP Location Left upper arm   BP Method Automatic   Patient Position Supine     Dr. Beth Urbina notified of BP after hydralazine given.  Orders received for Clonidine 0.1mg

## 2023-03-06 NOTE — FLOWSHEET NOTE
03/05/23 2337   Vital Signs   Temp 97.9 °F (36.6 °C)   Temp Source Oral   Heart Rate 91   Heart Rate Source Monitor   Resp 21   BP (!) 152/105   MAP (Calculated) 121   MAP (mmHg) 120   BP Location Left upper arm   Level of Consciousness 0   MEWS Score 2   Pain Assessment   Pain Assessment None - Denies Pain   Pain Level 0   Oxygen Therapy   SpO2 97 %   O2 Device None (Room air)     Hydralazine 25mg PO given for elevated BP

## 2023-03-06 NOTE — CARE COORDINATION
MSN, CM:  spoke with patient this AM about discharge planning. Patient lives with his wife in own home with no steps for entrance. Patient is independent with all ADLs' and requires no equipment for ambulation. Patient works full time and is able to drive himself. Patient has no discharge needs. Patient to be discharged home today with no services ordered or requested. Patient and family agree with this discharge plan. Patient has met all milestones for this admission. Family to transport patient home. 03/06/23 1219   Service Assessment   Patient Orientation Alert and Oriented   Cognition Alert   History Provided By Patient   Primary Caregiver Self   Accompanied By/Relationship Wife   Support Systems Spouse/Significant Other   Patient's Healthcare Decision Maker is: Legal Next of Kin  (mother)   PCP Verified by CM Yes   Last Visit to PCP Within last 3 months   Prior Functional Level Independent in ADLs/IADLs   Current Functional Level Independent in ADLs/IADLs   Can patient return to prior living arrangement Yes   Ability to make needs known: Good   Family able to assist with home care needs: Yes   Would you like for me to discuss the discharge plan with any other family members/significant others, and if so, who?  Yes  (Wife)   Financial Resources Other (Comment)  (BCBS)   Community Resources None   Social/Functional History   Lives With Spouse   Type of [address] One level   Home Access Level entry   Bathroom Shower/Tub Tub/Shower unit   Bathroom Toilet Standard   Bathroom Equipment Grab bars in [address] Responsibilities Yes   Ambulation Assistance Independent   Transfer Assistance Independent   Active  Yes   Mode of Transportation Truck   Occupation Full time employment   Type of Occupation production line   Discharge Planning   Type of Residence [facility]   Living Arrangements Spouse/Significant Other   Current Services Prior To Admission None   DME Ordered? No   Potential Assistance Purchasing Medications No   Type of Home Care Services None   Patient expects to be discharged to: House   One/Two Story Residence One story   History of falls? 0   Services At/After Discharge   Services At/After Discharge None   Mode of Transport at Discharge Other (see comment)  (family to transport)   Confirm Follow Up Transport Self   Condition of Participation: Discharge Planning   The Patient and/Or Patient Representative agree with the Discharge Plan?  Yes

## 2023-03-06 NOTE — DIABETES MGMT
Patient admitted with OMAR. Admit blood glucose 441. HgbA1C > 14. Blood glucose ranged 222-347 yesterday with patient receiving Lantus 15 units and Humalog 28 units. Blood glucose this morning was 267. Creatinine 1.30. GFR > 60. Reviewed patient current regimen: Lantus 15 units HS and Humalog correctional insulin. Patient would likely benefit from an increase in basal insulin as fasting glucose is not at goal.  Patient would also likely benefit from initiation of prandial insulin to help offset hyperglycemia during the day related to caloric intake. Provider udpated via [place] regarding recommendations and glycemic control. Per provider patient to discharge today on basal insulin. Patient given educational material, \"Diabetes Self-Management: A Patient Teaching Guide\", which was reviewed with patient. Explained basic physiology of diabetes, as well as causes, signs and symptoms, and treatments for hypoglycemia and hyperglycemia. Described the effects of poor glycemic control and the development of long-term complications such as renal, eye, nerve, and cardiovascular disease. Patient denies numbness and tingling in feet. Described proper diabetic foot care and the importance of checking feet daily. Per patient they typically drink water, coffee with creamer, and Gatorade. Discussed Gatorade and Powerade zero sugar options. Reviewed effects of sweetened beverages on glycemic control and discussed alternative beverages to help improve glycemic control. Patient voices that they do drink alcoholic beverages. Reviewed ADA recommendations for alcohol. Per patient they typically eat several times per day. 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 Discussed having a snack available at work and not going more than 6 hours without eating.   Educated re: effects of carbohydrates on blood glucose, the \"plate method\" of healthy meal planning, basics of healthy meal plan, Consistent Carbohydrate Diet, discussed the basics of carb counting and how to read a nutrition label. Educated patient regarding the benefits of physical activity (as cleared by provider) on glycemic control. Also explained the relationship between hyperglycemia and infection and delayed healing. Discussed target goals for blood glucose and A1C. Educated patient regarding diabetic medications including mechanism of action, timing, and possible side effects. Patient verbalizes understanding of teaching. Explained the importance of blood glucose monitoring. Recommended frequency of AC&HS blood glucose checks and to record in log book to take to PCP appointment. Demonstrated how to use a blood glucose meter, care of strips, and sharps disposal. Educated patient that all glucometers are similar but differ in small ways. Educated patient that they have to get the glucometer covered by their insurance formulary to keep their costs down. Educated patient to seek out  affordable glucometer options that exist at some stores or drug stores if they are ever uninsured. Pt was able to return demonstrate correct use of meter. Pt will need prescription for glucometer and glucometer supplies at discharge so that the patient may obtain the meter covered by their insurance. Patient was also instructed in proper use of insulin pens. Patient was able to return demonstrate proper use of insulin pen using injection model and saline demo pen. Patient educated regarding insulin administration sites. Patient also educated regarding the importance of site rotation, proper storage of insulin, and proper sharps disposal. Patient prefers pens. Patient will need prescription for insulin pens and pen needles at discharge. Educated patient regarding current basal/bolus regimen of Lantus 25 units HS and Humalog 8 units with meals including type of insulin, timing with meals, onset, duration of effect, and peak of insulin dose.  Educated patient on the differences between prandial insulin and correctional insulin. Instructed patient to always seek guidance from their primary care provider about adjusting their insulin doses and not to adjust them on their own as this could negatively impact their glycemic control or result in hypoglycemia. Patient verbalizes understanding. Patient would likely benefit from continued diabetes outpatient education. Patient provided with contact information to [facility] program to pursue at their convenience after discharge with their primary care provider. Patient did not want referral to outpatient program.    Encouraged compliance with discharge regimen. Encouraged patient to continue to work on lifestyle modifications and to follow up with PCP for further titration of regimen. Patient states does not currently have a PCP. Case management consulted for [facility] referral.  Patient verbalized understanding and voices no further questions regarding diabetes management at this time.

## 2023-03-06 NOTE — FLOWSHEET NOTE
03/05/23 1911   Vital Signs   Temp 97.9 °F (36.6 °C)   Temp Source Oral   Heart Rate 94   Resp 19   BP (!) 150/100   MAP (Calculated) 117   MAP (mmHg) 111   BP Location Left upper arm   Level of Consciousness 0   MEWS Score 1   Pain Assessment   Pain Assessment None - Denies Pain   Pain Level 0   Oxygen Therapy   SpO2 99 %   O2 Device None (Room air)     Dr. Palmira Alvarez notified of BP via perfect serve. No new orders received. Will continue to monitor.

## 2023-03-07 ENCOUNTER — TELEPHONE (OUTPATIENT)
Dept: INTERNAL MEDICINE CLINIC | Facility: CLINIC | Age: 53
End: 2023-03-07

## 2023-03-07 NOTE — TELEPHONE ENCOUNTER
Called patient to schedule TCV appt following discharge from [facility] 03/06/2023. Dx  Shortness of Breath,  OMAR , Diabetes mellitus, Elevated lipase. Patient states he is going to see his provider recommended  by employer to get a return to work excuse and will call back to schedule appt.

## 2023-03-08 NOTE — TELEPHONE ENCOUNTER
3rd attempt to contact patient using phone number listed in chart     [id number] MD Guido Celestin MA  Tried calling patient for Transition of Care(ISHAN) follow up. No answer.

## 2023-03-14 ENCOUNTER — OFFICE VISIT (OUTPATIENT)
Dept: INTERNAL MEDICINE CLINIC | Facility: CLINIC | Age: 53
End: 2023-03-14

## 2023-03-14 VITALS
BODY MASS INDEX: 32.23 KG/M2 | OXYGEN SATURATION: 98 % | HEART RATE: 85 BPM | HEIGHT: 72 IN | SYSTOLIC BLOOD PRESSURE: 141 MMHG | WEIGHT: 238 LBS | TEMPERATURE: 98.3 F | DIASTOLIC BLOOD PRESSURE: 105 MMHG

## 2023-03-14 DIAGNOSIS — E11.65 TYPE 2 DIABETES MELLITUS WITH HYPERGLYCEMIA, WITH LONG-TERM CURRENT USE OF INSULIN (HCC): ICD-10-CM

## 2023-03-14 DIAGNOSIS — I10 PRIMARY HYPERTENSION: ICD-10-CM

## 2023-03-14 DIAGNOSIS — Z09 HOSPITAL DISCHARGE FOLLOW-UP: Primary | ICD-10-CM

## 2023-03-14 DIAGNOSIS — F19.10 POLYSUBSTANCE ABUSE (HCC): ICD-10-CM

## 2023-03-14 DIAGNOSIS — Z79.4 TYPE 2 DIABETES MELLITUS WITH HYPERGLYCEMIA, WITH LONG-TERM CURRENT USE OF INSULIN (HCC): ICD-10-CM

## 2023-03-14 DIAGNOSIS — F41.1 GAD (GENERALIZED ANXIETY DISORDER): ICD-10-CM

## 2023-03-14 RX ORDER — INSULIN GLARGINE-YFGN 100 [IU]/ML
25 INJECTION, SOLUTION SUBCUTANEOUS NIGHTLY
COMMUNITY
Start: 2023-03-07 | End: 2023-03-14 | Stop reason: SDUPTHER

## 2023-03-14 RX ORDER — GLUCOSAMINE HCL/CHONDROITIN SU 500-400 MG
CAPSULE ORAL
Qty: 400 STRIP | Refills: 1 | Status: SHIPPED | OUTPATIENT
Start: 2023-03-14 | End: 2023-03-14 | Stop reason: SDUPTHER

## 2023-03-14 RX ORDER — INSULIN LISPRO 200 [IU]/ML
8 INJECTION, SOLUTION SUBCUTANEOUS
Status: CANCELLED | OUTPATIENT
Start: 2023-03-14

## 2023-03-14 RX ORDER — AMLODIPINE BESYLATE 10 MG/1
10 TABLET ORAL DAILY
Qty: 90 TABLET | Refills: 0 | Status: SHIPPED | OUTPATIENT
Start: 2023-03-14

## 2023-03-14 RX ORDER — PEN NEEDLE, DIABETIC 31 GX5/16"
1 NEEDLE, DISPOSABLE MISCELLANEOUS
Qty: 400 EACH | Refills: 1 | Status: SHIPPED | OUTPATIENT
Start: 2023-03-14

## 2023-03-14 RX ORDER — AMLODIPINE BESYLATE 5 MG/1
5 TABLET ORAL DAILY
Qty: 90 TABLET | Refills: 1 | Status: CANCELLED | OUTPATIENT
Start: 2023-03-14

## 2023-03-14 RX ORDER — CITALOPRAM 10 MG/1
10 TABLET ORAL DAILY
Qty: 90 TABLET | Refills: 0 | Status: SHIPPED | OUTPATIENT
Start: 2023-03-14

## 2023-03-14 RX ORDER — INSULIN GLARGINE-YFGN 100 [IU]/ML
25 INJECTION, SOLUTION SUBCUTANEOUS NIGHTLY
Qty: 15 ML | Refills: 2 | Status: SHIPPED | OUTPATIENT
Start: 2023-03-14

## 2023-03-14 RX ORDER — INSULIN LISPRO 200 [IU]/ML
8 INJECTION, SOLUTION SUBCUTANEOUS
Qty: 15 ADJUSTABLE DOSE PRE-FILLED PEN SYRINGE | Refills: 5 | Status: SHIPPED | OUTPATIENT
Start: 2023-03-14

## 2023-03-14 RX ORDER — BLOOD-GLUCOSE METER
1 EACH MISCELLANEOUS 4 TIMES DAILY
COMMUNITY
Start: 2023-03-06

## 2023-03-14 RX ORDER — GLUCOSAMINE HCL/CHONDROITIN SU 500-400 MG
100 CAPSULE ORAL
Qty: 100 STRIP | Refills: 5 | Status: SHIPPED | OUTPATIENT
Start: 2023-03-14 | End: 2023-04-13

## 2023-03-14 SDOH — ECONOMIC STABILITY: FOOD INSECURITY: WITHIN THE PAST 12 MONTHS, YOU WORRIED THAT YOUR FOOD WOULD RUN OUT BEFORE YOU GOT MONEY TO BUY MORE.: NEVER TRUE

## 2023-03-14 SDOH — ECONOMIC STABILITY: INCOME INSECURITY: HOW HARD IS IT FOR YOU TO PAY FOR THE VERY BASICS LIKE FOOD, HOUSING, MEDICAL CARE, AND HEATING?: NOT HARD AT ALL

## 2023-03-14 SDOH — ECONOMIC STABILITY: FOOD INSECURITY: WITHIN THE PAST 12 MONTHS, THE FOOD YOU BOUGHT JUST DIDN'T LAST AND YOU DIDN'T HAVE MONEY TO GET MORE.: NEVER TRUE

## 2023-03-14 SDOH — ECONOMIC STABILITY: HOUSING INSECURITY
IN THE LAST 12 MONTHS, WAS THERE A TIME WHEN YOU DID NOT HAVE A STEADY PLACE TO SLEEP OR SLEPT IN A SHELTER (INCLUDING NOW)?: NO

## 2023-03-14 ASSESSMENT — ANXIETY QUESTIONNAIRES
GAD7 TOTAL SCORE: 6
5. BEING SO RESTLESS THAT IT IS HARD TO SIT STILL: 1
4. TROUBLE RELAXING: 1
6. BECOMING EASILY ANNOYED OR IRRITABLE: 0
2. NOT BEING ABLE TO STOP OR CONTROL WORRYING: 1
IF YOU CHECKED OFF ANY PROBLEMS ON THIS QUESTIONNAIRE, HOW DIFFICULT HAVE THESE PROBLEMS MADE IT FOR YOU TO DO YOUR WORK, TAKE CARE OF THINGS AT HOME, OR GET ALONG WITH OTHER PEOPLE: NOT DIFFICULT AT ALL
3. WORRYING TOO MUCH ABOUT DIFFERENT THINGS: 1
1. FEELING NERVOUS, ANXIOUS, OR ON EDGE: 1
7. FEELING AFRAID AS IF SOMETHING AWFUL MIGHT HAPPEN: 1

## 2023-03-14 ASSESSMENT — ENCOUNTER SYMPTOMS
EYE PAIN: 0
VOMITING: 0
SORE THROAT: 0
SINUS PAIN: 0
CONSTIPATION: 0
NAUSEA: 0
DIARRHEA: 0
BACK PAIN: 0
SHORTNESS OF BREATH: 0
COUGH: 0
RHINORRHEA: 0
ABDOMINAL PAIN: 0

## 2023-03-14 ASSESSMENT — PATIENT HEALTH QUESTIONNAIRE - PHQ9
2. FEELING DOWN, DEPRESSED OR HOPELESS: 0
SUM OF ALL RESPONSES TO PHQ QUESTIONS 1-9: 0
SUM OF ALL RESPONSES TO PHQ9 QUESTIONS 1 & 2: 0
1. LITTLE INTEREST OR PLEASURE IN DOING THINGS: 0

## 2023-03-14 NOTE — PROGRESS NOTES
[address]  Tel# [phone]  Fax# [phone]     Evelyn Bumpers, [place], CHAY-BC  Family Nurse Practitioner            Date of Visit: 2023     Ralph Montes (: 1970) is a 50 y.o. male  new patient, here for evaluation of the following chief complaint(s):    Chief Complaint   Patient presents with    Diabetes     The pt is new to our practice and is in to f/u on his DM and Htn. The pt is confused about how to use his testing supplies and his insulin. He also notes he has had his BP medication today. Patient Care Team:  None None as PCP - General         History of Present Illness      New Patient  Presents here as a new patient. Denies having a PCP. Moved from [facility] 2 years ago. Has family here in [facility]. Hospital Follow Up  Pt states one day when he was at home, was jittery, states his girlfriend took him to the ER. States he does not remember much about the hospitalization, remembers about IV and insulin injections. Denies family history of diabetes. Prior to ER/admission  Diet: states \"I eat too much\", fast foods  Physical activity: walking; recently, been busy at work, works in a lab for a [address]        Hypertension  Chronic  Denies family hx  States he has not been on BP meds for years prior to recent ER/Admission. Denies previous meds. Alcohol Use  Has been drinking for 20 years  Denies problems with alcohol abuse  Drinks alcohol twice a week, beer or liquor, 1-2 cans of beers  Denies alcohol use daily      Smoking Cigarettes  20 years plus  Smokes 1/2 PPD  Denies smoking 1 PPD for years. Marijuana  Smokes marijuana twice a week, recreation. Denies any other illicit drugs. + UDS  Thinks that his marijuana was mixed with other drugs. States he was smoking with friends at that time. States he no longer smoke with them.       Diabetes  New Diagnosed at the hospital.  HgbA1C > 14%  Denies family history of diabetes. Since he was discharged at home, states he is confused about his insulin schedule. Anxiety  States he noticed some anxiety in the last month  Denies about any particular worry. Denies family hx of anxiety. DENISE 7 SCORE 3/14/2023   DENISE-7 Total Score 6     Interpretation of DENISE-7 score: 5-9 = mild anxiety, 10-14 = moderate anxiety, 15+ = severe anxiety. Recommend referral to behavioral health for scores 10 or greater. Patient Active Problem List   Diagnosis    Hyperglycemia due to diabetes mellitus (HCC)    Polysubstance abuse ([facility]    Type 2 diabetes mellitus with hyperglycemia, with long-term current use of insulin ([facility]    Primary hypertension    DENISE (generalized anxiety disorder)       Past Medical History:   Diagnosis Date    OMAR (acute kidney injury) ([facility] 03/04/2023    Anxiety     Diabetes mellitus ([facility] 03/04/2023    New Onset HgbA1C > 14    Elevated lipase 03/04/2023    Hypertension     Marijuana use     Mild cardiomegaly 03/04/2023    Seen on CXR    Positive urine drug screen 03/04/2023    + cocaine, + amphetamines, + THC       History reviewed. No pertinent surgical history. Family History   Problem Relation Age of Onset    Kidney Disease Mother     Alzheimer's Disease Father     No Known Problems Brother          ALLERGY  No Known Allergies        Current Outpatient Medications on File Prior to Visit   Medication Sig Dispense Refill    Blood Glucose Monitoring Suppl (ACCU-CHEK GUIDE ME) w/Device KIT Inject 1 Lancet into the skin in the morning, at noon, in the evening, and at bedtime      Multiple Vitamin (MULTIVITAMIN ADULT PO) Take 1 tablet by mouth daily       No current facility-administered medications on file prior to visit. Review of Systems  Review of Systems   Constitutional:  Negative for chills, fatigue and fever. HENT:  Negative for congestion, postnasal drip, rhinorrhea, sinus pain, sneezing and sore throat.     Eyes: Negative for pain and visual disturbance. Respiratory:  Negative for cough and shortness of breath. Cardiovascular:  Negative for chest pain, palpitations and leg swelling. Gastrointestinal:  Negative for abdominal pain, constipation, diarrhea, nausea and vomiting. Genitourinary:  Negative for dysuria, frequency and urgency. Musculoskeletal:  Negative for back pain, gait problem and joint swelling. Skin:  Negative for rash and wound. Neurological:  Negative for dizziness and headaches. Psychiatric/Behavioral:  Negative for behavioral problems, sleep disturbance and suicidal ideas. The patient is nervous/anxious. Vitals:    03/14/23 1413 03/14/23 1432   BP: (!) 145/102 (!) 141/105   Site: Right Upper Arm Right Upper Arm   Position: Sitting Sitting   Cuff Size: Large Adult Large Adult   Pulse: 88 85   Temp: 98.3 °F (36.8 °C)    TempSrc: Temporal    SpO2: 98%    Weight: 238 lb (108 kg)    Height: 6' (1.829 m)               Physical Exam  Physical Exam  Constitutional:       General: He is not in acute distress. Appearance: He is not ill-appearing. HENT:      Head: Normocephalic and atraumatic. Eyes:      Pupils: Pupils are equal, round, and reactive to light. Cardiovascular:      Rate and Rhythm: Normal rate and regular rhythm. Pulmonary:      Effort: Pulmonary effort is normal. No respiratory distress. Breath sounds: Normal breath sounds. Abdominal:      General: Bowel sounds are normal.      Palpations: Abdomen is soft. Musculoskeletal:         General: Normal range of motion. Cervical back: Neck supple. Skin:     General: Skin is warm and dry. Neurological:      General: No focal deficit present. Mental Status: He is alert and oriented to person, place, and time. Psychiatric:         Mood and Affect: Mood normal.         Thought Content:  Thought content normal.        Diabetic foot exam:   Left Foot:   Visual Exam: normal   Pulse DP: 2+ (normal)   Filament test: 6/6   Vibratory Sensation: normal  Right Foot:   Visual Exam: normal   Pulse DP: 2+ (normal)   Filament test: 6/6   Vibratory Sensation: normal           Assessment/Plan:          ICD-10-CM    1. Hospital discharge follow-up  Z09 CT DISCHARGE MEDS RECONCILED W/ CURRENT OUTPATIENT MED LIST      2. Type 2 diabetes mellitus with hyperglycemia, with long-term current use of insulin ([facility])  E11.65 Insulin Pen Needle (B-D ULTRAFINE III SHORT PEN) 31G X 8 MM MISC    Z79.4 insulin lispro (HUMALOG KWIKPEN) 200 UNIT/ML SOPN pen     SEMGL, YFGN, 100 UNIT/ML [facility]     blood glucose monitor strips     Lancets 30G MISC     DISCONTINUED: blood glucose monitor strips     DISCONTINUED: Lancets 30G MISC      3. Primary hypertension  I10 amLODIPine (NORVASC) 10 MG tablet      4. Polysubstance abuse ([address]  F19.10       5. DENISE (generalized anxiety disorder)  F41.1 citalopram (CELEXA) 10 MG tablet               Rosas Cardenas was seen today for diabetes. Diagnoses and all orders for this visit:    Hospital discharge follow-up  -     CT DISCHARGE MEDS RECONCILED W/ CURRENT OUTPATIENT MED LIST    Type 2 diabetes mellitus with hyperglycemia, with long-term current use of insulin ([facility])  -     Discontinue: blood glucose monitor strips; by Other route 4 times daily (before meals and nightly) Test 4 times a day & as needed for symptoms of irregular blood glucose. Dispense sufficient amount for indicated testing frequency plus additional to accommodate PRN testing needs. Pharmacist to identify preferred brand.  -     Discontinue: Lancets 30G MISC; 1 each by Does not apply route 4 times daily (before meals and nightly) Test 4   times a day & as needed for symptoms of irregular blood glucose. Dispense sufficient amount for indicated testing frequency plus additional to accommodate PRN testing needs. Pharmacist to identify preferred brand.   -     Insulin Pen Needle (B-D ULTRAFINE III SHORT PEN) 31G X 8 MM MISC; 1 each by Does not apply route 4 times daily (before meals and nightly)  -     insulin lispro (HUMALOG KWIKPEN) 200 UNIT/ML SOPN pen; Inject 8 Units into the skin 3 times daily (before meals)  -     SEMGLEE, YFGN, 100 UNIT/ML SOPN; Inject 25 Units into the skin at bedtime  -     [facility] Endocrinology  -     blood glucose monitor strips; 100 strips by Other route 4 times daily (before meals and nightly) Test 4 times a day & as needed for symptoms of irregular blood glucose.  -     Lancets 30G MISC; Test 4x a day & as needed for symptoms of irregular blood glucose.    Primary hypertension  -     amLODIPine (NORVASC) 10 MG tablet; Take 1 tablet by mouth daily    Polysubstance abuse (HCC)    DENISE (generalized anxiety disorder)  -     citalopram (CELEXA) 10 MG tablet; Take 1 tablet by mouth daily       Advised to follow diabetic diet. Advised on making better food choices such as nutrient dense, whole foods. Advised on foods rich in fiber. Advised to avoid sweet foods, sweet drinks, foods high on added sugar. Advised to read food labels. Advised on physical activity or exercise 3-5 days a week, at least 30 minutes, as tolerated. Advised on sign/symptoms of hypoglycemia and hyperglycemia. Advised to take medications as prescribed and to report any medication side effects or intolerance or insurance coverage issues.       Anxiety  Advised on relaxation techniques. Advised to limit or avoid stress. Advised to avoid or limit aggravating or precipitating factors. Advised to seek immediate medical attention for any chest pains, palpitations or suicidal thoughts.       Advised to abstain from smoking cigarettes, marijuana; avoid or abstain alcohol use. Reviewed DM complications, CV and CA risks.      Reviewed insulin use with pt.      Orders Placed This Encounter   Procedures    [facility] Endocrinology     Referral Priority:   Routine     Referral  Type:   Eval and Treat     Referral Reason:   Specialty Services Required     Requested Specialty:   Endocrinology     Number of Visits Requested:   1    MN DISCHARGE MEDS RECONCILED W/ CURRENT OUTPATIENT MED LIST                    Follow Up  Return in about 4 weeks (around 4/11/2023), or if symptoms worsen or fail to improve. Yessi Parada DNP, FNP-BC         Post-Discharge Transitional Care  Follow Up      Paulino Menon   YOB: 1970    Date of Office Visit:  3/14/2023  Date of Hospital Admission: 3/4/23  Date of Hospital Discharge: 3/6/23  Risk of hospital readmission (high >=14%. Medium >=10%) :Readmission Risk Score: 6.9      Care management risk score Rising risk (score 2-5) and Complex Care (Scores >=6): No Risk Score On File     Non face to face  following discharge, date last encounter closed (first attempt may have been earlier): *No documented post hospital discharge outreach found in the last 14 days    Call initiated 2 business days of discharge: *No response recorded in the last 14 days    ASSESSMENT/PLAN:   Hospital discharge follow-up  -     [place] DISCHARGE MEDS RECONCILED W/ CURRENT OUTPATIENT MED LIST  Type 2 diabetes mellitus with hyperglycemia, with long-term current use of insulin (HCC)  -     Insulin Pen Needle (B-D ULTRAFINE III SHORT PEN) 31G X 8 MM MISC; 4 TIMES DAILY BEFORE MEALS & NIGHTLY Starting Tue 3/14/2023, Disp-400 each, R-1, Normal  -     insulin lispro (HUMALOG KWIKPEN) 200 UNIT/ML SOPN pen;  Inject 8 Units into the skin 3 times daily (before meals), Disp-15 Adjustable Dose Pre-filled Pen Syringe, R-5Normal  -     SEMGLEE, YFGN, 100 UNIT/ML SOPN; Inject 25 Units into the skin at bedtime, Disp-15 mL, R-2, DAWNormal  -     [address] - Diabetes, [facility] Endocrinology  -     blood glucose monitor strips; 100 strips by Other route 4 times daily (before meals and nightly) Test 4 times a day & as needed for symptoms of irregular blood glucose., Other, 4 TIMES DAILY BEFORE MEALS & NIGHTLY Starting Tue 3/14/2023, Until Thu 4/13/2023, For 30 days, Disp-100 strip, R-5, Normal  -     Lancets 30G MISC; Disp-100 each, R-5, NormalTest 4x a day & as needed for symptoms of irregular blood glucose. Primary hypertension  -     amLODIPine (NORVASC) 10 MG tablet; Take 1 tablet by mouth daily, Disp-90 tablet, R-0Normal  Polysubstance abuse (HCC)  DENISE (generalized anxiety disorder)  -     citalopram (CELEXA) 10 MG tablet; Take 1 tablet by mouth daily, Disp-90 tablet, R-0Normal         Subjective:   HPI:  Follow up of Hospital problems/diagnosis(es): see HPI above    Inpatient course: Discharge summary reviewed- see chart. Interval history/Current status: stable    Patient Active Problem List   Diagnosis    Hyperglycemia due to diabetes mellitus ([facility]    [facility]    Type 2 diabetes mellitus with hyperglycemia, with long-term current use of insulin ([facility]    Primary hypertension    DENISE (generalized anxiety disorder)       Medications listed as ordered at the time of discharge from hospital     Medication List            Accurate as of March 14, 2023  3:21 PM. If you have any questions, ask your nurse or doctor. START taking these medications      amLODIPine 10 MG tablet  Commonly known as: NORVASC  Take 1 tablet by mouth daily  Started by: GERA Montalvo CNP     citalopram 10 MG tablet  Commonly known as: CeleXA  Take 1 tablet by mouth daily  Started by: GERA Montalvo CNP            CHANGE how you take these medications      blood glucose test strips  100 strips by Other route 4 times daily (before meals and nightly) Test 4 times a day & as needed for symptoms of irregular blood glucose. What changed:   how much to take  additional instructions  Changed by:  GEAR Montalvo CNP     HumaLOG KwikPen 200 UNIT/ML Sopn pen  Generic drug: insulin lispro  Inject 8 Units into the skin 3 times daily (before meals)  What changed: when to take this  Changed by: GERA Saravia CNP     Lancets 30G Misc  Test 4x a day & as needed for symptoms of irregular blood glucose. What changed:   how much to take  how to take this  when to take this  additional instructions  Changed by:  GERA Saravia CNP            CONTINUE taking these medications      Accu-Chek Guide Me w/Device Kit     B-D ULTRAFINE III SHORT PEN 31G X 8 MM Misc  Generic drug: Insulin Pen Needle  1 each by Does not apply route 4 times daily (before meals and nightly)     MULTIVITAMIN ADULT PO     Semglee (yfgn) 100 UNIT/ML Sopn  Generic drug: Insulin Glargine-yfgn  Inject 25 Units into the skin at bedtime               Where to Get Your Medications        These medications were sent to [facility], [address] [facility] [address] [address]      Phone: [phone]   amLODIPine 10 MG tablet  B-D ULTRAFINE III SHORT PEN 31G X 8 MM Misc  blood glucose test strips  citalopram 10 MG tablet  HumaLOG KwikPen 200 UNIT/ML Sopn pen  Lancets 30G Misc  Semglee (yfgn) 100 UNIT/ML Sopn           Medications marked \"taking\" at this time  Outpatient Medications Marked as Taking for the 3/14/23 encounter (Office Visit) with GERA Saravia CNP   Medication Sig Dispense Refill    Blood Glucose Monitoring Suppl (ACCU-CHEK GUIDE ME) w/Device KIT Inject 1 Lancet into the skin in the morning, at noon, in the evening, and at bedtime      Insulin Pen Needle (B-D ULTRAFINE III SHORT PEN) 31G X 8 MM MISC 1 each by Does not apply route 4 times daily (before meals and nightly) 400 each 1    amLODIPine (NORVASC) 10 MG tablet Take 1 tablet by mouth daily 90 tablet 0    citalopram (CELEXA) 10 MG tablet Take 1 tablet by mouth daily 90 tablet 0    insulin lispro (HUMALOG KWIKPEN) 200 UNIT/ML SOPN pen Inject 8 Units into the skin 3 times daily (before meals) 15 Adjustable Dose Pre-filled Pen Syringe 5    SEMGLEE, YFGN, 100 UNIT/ML SOPN Inject 25 Units into the skin at bedtime 15 mL 2    blood glucose monitor strips 100 strips by Other route 4 times daily (before meals and nightly) Test 4 times a day & as needed for symptoms of irregular blood glucose. 100 strip 5    Lancets 30G MISC Test 4x a day & as needed for symptoms of irregular blood glucose. 100 each 5    Multiple Vitamin (MULTIVITAMIN ADULT PO) Take 1 tablet by mouth daily          Medications patient taking as of now reconciled against medications ordered at time of hospital discharge: Yes        Objective:    BP (!) 141/105 (Site: Right Upper Arm, Position: Sitting, Cuff Size: Large Adult)   Pulse 85   Temp 98.3 °F (36.8 °C) (Temporal)   Ht 6' (1.829 m)   Wt 238 lb (108 kg)   SpO2 98%   BMI 32.28 kg/m²         An electronic signature was used to authenticate this note.   --Jovanny Bustos, GERA - CNP

## 2023-04-10 ENCOUNTER — HOSPITAL ENCOUNTER (INPATIENT)
Age: 53
LOS: 4 days | Discharge: HOME HEALTH | DRG: 639 | End: 2023-04-14
Attending: EMERGENCY MEDICINE | Admitting: FAMILY MEDICINE
Payer: COMMERCIAL

## 2023-04-10 ENCOUNTER — APPOINTMENT (OUTPATIENT)
Dept: MRI IMAGING | Age: 53
DRG: 639 | End: 2023-04-10
Payer: COMMERCIAL

## 2023-04-10 ENCOUNTER — APPOINTMENT (OUTPATIENT)
Dept: CT IMAGING | Age: 53
DRG: 639 | End: 2023-04-10
Payer: COMMERCIAL

## 2023-04-10 DIAGNOSIS — R25.8 CHOREIFORM MOVEMENT: Primary | ICD-10-CM

## 2023-04-10 DIAGNOSIS — G25.5 CHOREA: ICD-10-CM

## 2023-04-10 DIAGNOSIS — E11.65 TYPE 2 DIABETES MELLITUS WITH HYPERGLYCEMIA, WITH LONG-TERM CURRENT USE OF INSULIN (HCC): ICD-10-CM

## 2023-04-10 DIAGNOSIS — R25.9 ABNORMAL INVOLUNTARY MOVEMENTS: ICD-10-CM

## 2023-04-10 DIAGNOSIS — Z79.4 TYPE 2 DIABETES MELLITUS WITH HYPERGLYCEMIA, WITH LONG-TERM CURRENT USE OF INSULIN (HCC): ICD-10-CM

## 2023-04-10 LAB
ALBUMIN SERPL-MCNC: 3.7 G/DL (ref 3.5–5)
ALBUMIN/GLOB SERPL: 0.8 (ref 0.4–1.6)
ALP SERPL-CCNC: 95 U/L (ref 50–136)
ALT SERPL-CCNC: 17 U/L (ref 12–65)
AMPHET UR QL SCN: NEGATIVE
ANION GAP SERPL CALC-SCNC: 3 MMOL/L (ref 2–11)
AST SERPL-CCNC: 16 U/L (ref 15–37)
BARBITURATES UR QL SCN: NEGATIVE
BASOPHILS # BLD: 0.1 K/UL (ref 0–0.2)
BASOPHILS NFR BLD: 1 % (ref 0–2)
BENZODIAZ UR QL: NEGATIVE
BILIRUB SERPL-MCNC: 1.1 MG/DL (ref 0.2–1.1)
BUN SERPL-MCNC: 18 MG/DL (ref 6–23)
CALCIUM SERPL-MCNC: 9.5 MG/DL (ref 8.3–10.4)
CANNABINOIDS UR QL SCN: POSITIVE
CHLORIDE SERPL-SCNC: 106 MMOL/L (ref 101–110)
CK SERPL-CCNC: 192 U/L (ref 21–215)
CO2 SERPL-SCNC: 26 MMOL/L (ref 21–32)
COCAINE UR QL SCN: NEGATIVE
CREAT SERPL-MCNC: 1.5 MG/DL (ref 0.8–1.5)
DIFFERENTIAL METHOD BLD: ABNORMAL
EOSINOPHIL # BLD: 0.3 K/UL (ref 0–0.8)
EOSINOPHIL NFR BLD: 2 % (ref 0.5–7.8)
ERYTHROCYTE [DISTWIDTH] IN BLOOD BY AUTOMATED COUNT: 14.2 % (ref 11.9–14.6)
GLOBULIN SER CALC-MCNC: 4.7 G/DL (ref 2.8–4.5)
GLUCOSE SERPL-MCNC: 101 MG/DL (ref 65–100)
HCT VFR BLD AUTO: 40.7 % (ref 41.1–50.3)
HGB BLD-MCNC: 13.7 G/DL (ref 13.6–17.2)
IMM GRANULOCYTES # BLD AUTO: 0 K/UL (ref 0–0.5)
IMM GRANULOCYTES NFR BLD AUTO: 0 % (ref 0–5)
LYMPHOCYTES # BLD: 3 K/UL (ref 0.5–4.6)
LYMPHOCYTES NFR BLD: 23 % (ref 13–44)
MAGNESIUM SERPL-MCNC: 1.8 MG/DL (ref 1.8–2.4)
MCH RBC QN AUTO: 29.2 PG (ref 26.1–32.9)
MCHC RBC AUTO-ENTMCNC: 33.7 G/DL (ref 31.4–35)
MCV RBC AUTO: 86.8 FL (ref 82–102)
METHADONE UR QL: NEGATIVE
MONOCYTES # BLD: 1.1 K/UL (ref 0.1–1.3)
MONOCYTES NFR BLD: 9 % (ref 4–12)
NEUTS SEG # BLD: 8.4 K/UL (ref 1.7–8.2)
NEUTS SEG NFR BLD: 65 % (ref 43–78)
NRBC # BLD: 0 K/UL (ref 0–0.2)
OPIATES UR QL: NEGATIVE
PCP UR QL: NEGATIVE
PLATELET # BLD AUTO: 385 K/UL (ref 150–450)
PMV BLD AUTO: 10.4 FL (ref 9.4–12.3)
POTASSIUM SERPL-SCNC: 3.6 MMOL/L (ref 3.5–5.1)
PROT SERPL-MCNC: 8.4 G/DL (ref 6.3–8.2)
RBC # BLD AUTO: 4.69 M/UL (ref 4.23–5.6)
RPR SER QL: NONREACTIVE
SODIUM SERPL-SCNC: 135 MMOL/L (ref 133–143)
TSH, 3RD GENERATION: 3.29 UIU/ML (ref 0.36–3.74)
VIT B12 SERPL-MCNC: 747 PG/ML (ref 193–986)
WBC # BLD AUTO: 12.9 K/UL (ref 4.3–11.1)

## 2023-04-10 PROCEDURE — 1100000000 HC RM PRIVATE

## 2023-04-10 PROCEDURE — 80307 DRUG TEST PRSMV CHEM ANLYZR: CPT

## 2023-04-10 PROCEDURE — 86255 FLUORESCENT ANTIBODY SCREEN: CPT

## 2023-04-10 PROCEDURE — 6370000000 HC RX 637 (ALT 250 FOR IP): Performed by: PSYCHIATRY & NEUROLOGY

## 2023-04-10 PROCEDURE — 70450 CT HEAD/BRAIN W/O DYE: CPT

## 2023-04-10 PROCEDURE — 86592 SYPHILIS TEST NON-TREP QUAL: CPT

## 2023-04-10 PROCEDURE — 6360000002 HC RX W HCPCS: Performed by: EMERGENCY MEDICINE

## 2023-04-10 PROCEDURE — 83735 ASSAY OF MAGNESIUM: CPT

## 2023-04-10 PROCEDURE — 96374 THER/PROPH/DIAG INJ IV PUSH: CPT

## 2023-04-10 PROCEDURE — 99285 EMERGENCY DEPT VISIT HI MDM: CPT

## 2023-04-10 PROCEDURE — 85025 COMPLETE CBC W/AUTO DIFF WBC: CPT

## 2023-04-10 PROCEDURE — 84443 ASSAY THYROID STIM HORMONE: CPT

## 2023-04-10 PROCEDURE — 82962 GLUCOSE BLOOD TEST: CPT

## 2023-04-10 PROCEDURE — 82550 ASSAY OF CK (CPK): CPT

## 2023-04-10 PROCEDURE — 82784 ASSAY IGA/IGD/IGG/IGM EACH: CPT

## 2023-04-10 PROCEDURE — 82607 VITAMIN B-12: CPT

## 2023-04-10 PROCEDURE — 86038 ANTINUCLEAR ANTIBODIES: CPT

## 2023-04-10 PROCEDURE — 86148 ANTI-PHOSPHOLIPID ANTIBODY: CPT

## 2023-04-10 PROCEDURE — 86341 ISLET CELL ANTIBODY: CPT

## 2023-04-10 PROCEDURE — 86051 AQUAPORIN-4 ANTB ELISA: CPT

## 2023-04-10 PROCEDURE — 80053 COMPREHEN METABOLIC PANEL: CPT

## 2023-04-10 PROCEDURE — 70551 MRI BRAIN STEM W/O DYE: CPT

## 2023-04-10 PROCEDURE — 86596 VOLTAGE-GTD CA CHNL ANTB EA: CPT

## 2023-04-10 PROCEDURE — 99222 1ST HOSP IP/OBS MODERATE 55: CPT | Performed by: PSYCHIATRY & NEUROLOGY

## 2023-04-10 RX ORDER — SODIUM CHLORIDE 0.9 % (FLUSH) 0.9 %
5-40 SYRINGE (ML) INJECTION PRN
Status: DISCONTINUED | OUTPATIENT
Start: 2023-04-10 | End: 2023-04-14 | Stop reason: HOSPADM

## 2023-04-10 RX ORDER — INSULIN GLARGINE 100 [IU]/ML
25 INJECTION, SOLUTION SUBCUTANEOUS EVERY MORNING
Status: DISCONTINUED | OUTPATIENT
Start: 2023-04-11 | End: 2023-04-13

## 2023-04-10 RX ORDER — INSULIN LISPRO 100 [IU]/ML
0-8 INJECTION, SOLUTION INTRAVENOUS; SUBCUTANEOUS
Status: DISCONTINUED | OUTPATIENT
Start: 2023-04-11 | End: 2023-04-14 | Stop reason: HOSPADM

## 2023-04-10 RX ORDER — DEXTROSE MONOHYDRATE 100 MG/ML
INJECTION, SOLUTION INTRAVENOUS CONTINUOUS PRN
Status: DISCONTINUED | OUTPATIENT
Start: 2023-04-10 | End: 2023-04-14 | Stop reason: HOSPADM

## 2023-04-10 RX ORDER — ACETAMINOPHEN 650 MG/1
650 SUPPOSITORY RECTAL EVERY 6 HOURS PRN
Status: DISCONTINUED | OUTPATIENT
Start: 2023-04-10 | End: 2023-04-14 | Stop reason: HOSPADM

## 2023-04-10 RX ORDER — LORAZEPAM 2 MG/ML
1 INJECTION INTRAMUSCULAR ONCE
Status: COMPLETED | OUTPATIENT
Start: 2023-04-10 | End: 2023-04-10

## 2023-04-10 RX ORDER — INSULIN LISPRO 100 [IU]/ML
0-4 INJECTION, SOLUTION INTRAVENOUS; SUBCUTANEOUS NIGHTLY
Status: DISCONTINUED | OUTPATIENT
Start: 2023-04-10 | End: 2023-04-14 | Stop reason: HOSPADM

## 2023-04-10 RX ORDER — ACETAMINOPHEN 325 MG/1
650 TABLET ORAL EVERY 6 HOURS PRN
Status: DISCONTINUED | OUTPATIENT
Start: 2023-04-10 | End: 2023-04-14 | Stop reason: HOSPADM

## 2023-04-10 RX ORDER — POLYETHYLENE GLYCOL 3350 17 G/17G
17 POWDER, FOR SOLUTION ORAL DAILY PRN
Status: DISCONTINUED | OUTPATIENT
Start: 2023-04-10 | End: 2023-04-14 | Stop reason: HOSPADM

## 2023-04-10 RX ORDER — SODIUM CHLORIDE 9 MG/ML
INJECTION, SOLUTION INTRAVENOUS PRN
Status: DISCONTINUED | OUTPATIENT
Start: 2023-04-10 | End: 2023-04-14 | Stop reason: HOSPADM

## 2023-04-10 RX ORDER — OLANZAPINE 5 MG/1
5 TABLET ORAL NIGHTLY
Status: DISCONTINUED | OUTPATIENT
Start: 2023-04-10 | End: 2023-04-11

## 2023-04-10 RX ORDER — SODIUM CHLORIDE 0.9 % (FLUSH) 0.9 %
5-40 SYRINGE (ML) INJECTION EVERY 12 HOURS SCHEDULED
Status: DISCONTINUED | OUTPATIENT
Start: 2023-04-10 | End: 2023-04-14 | Stop reason: HOSPADM

## 2023-04-10 RX ADMIN — OLANZAPINE 5 MG: 5 TABLET, FILM COATED ORAL at 22:09

## 2023-04-10 RX ADMIN — LORAZEPAM 1 MG: 2 INJECTION INTRAMUSCULAR; INTRAVENOUS at 16:49

## 2023-04-10 ASSESSMENT — ENCOUNTER SYMPTOMS
COLOR CHANGE: 0
DIARRHEA: 0
NAUSEA: 0
ABDOMINAL PAIN: 0
SORE THROAT: 0
WHEEZING: 0
VOMITING: 0
EYE REDNESS: 0
SHORTNESS OF BREATH: 0
COUGH: 0

## 2023-04-10 ASSESSMENT — LIFESTYLE VARIABLES
HOW OFTEN DO YOU HAVE A DRINK CONTAINING ALCOHOL: NEVER
HOW MANY STANDARD DRINKS CONTAINING ALCOHOL DO YOU HAVE ON A TYPICAL DAY: PATIENT DOES NOT DRINK

## 2023-04-10 ASSESSMENT — PAIN SCALES - GENERAL: PAINLEVEL_OUTOF10: 0

## 2023-04-10 NOTE — ED PROVIDER NOTES
shift or   mass effect. There is no abnormal extra-axial fluid collection or acute   intracranial hemorrhage. Relatively subtle hypodensities in bilateral caudate nuclei and to a lesser   extent the lentiform nuclei. Gray-white interface otherwise appears distinct. Bones and extracranial soft tissues: The calvarium is intact. The visualized paranasal sinuses and mastoid air cells   are clear. Impression    1. Patchy hypodensities in bilateral basal ganglia. These are nonspecific, and   could represent age-indeterminate ischemic changes, toxic/metabolic insult,   various encephalitides, demyelinating process, or CJD in the appropriate   clinical setting. MRI of the brain without and with contrast is suggested for   further evaluation. 2. Mild generalized brain volume loss.                  Pepe Espinal M.D.   4/10/2023 5:52:00 PM   CBC with Auto Differential   Result Value Ref Range    WBC 12.9 (H) 4.3 - 11.1 K/uL    RBC 4.69 4.23 - 5.6 M/uL    Hemoglobin 13.7 13.6 - 17.2 g/dL    Hematocrit 40.7 (L) 41.1 - 50.3 %    MCV 86.8 82 - 102 FL    MCH 29.2 26.1 - 32.9 PG    MCHC 33.7 31.4 - 35.0 g/dL    RDW 14.2 11.9 - 14.6 %    Platelets 600 394 - 621 K/uL    MPV 10.4 9.4 - 12.3 FL    nRBC 0.00 0.0 - 0.2 K/uL    Differential Type AUTOMATED      Seg Neutrophils 65 43 - 78 %    Lymphocytes 23 13 - 44 %    Monocytes 9 4.0 - 12.0 %    Eosinophils % 2 0.5 - 7.8 %    Basophils 1 0.0 - 2.0 %    Immature Granulocytes 0 0.0 - 5.0 %    Segs Absolute 8.4 (H) 1.7 - 8.2 K/UL    Absolute Lymph # 3.0 0.5 - 4.6 K/UL    Absolute Mono # 1.1 0.1 - 1.3 K/UL    Absolute Eos # 0.3 0.0 - 0.8 K/UL    Basophils Absolute 0.1 0.0 - 0.2 K/UL    Absolute Immature Granulocyte 0.0 0.0 - 0.5 K/UL   Comprehensive Metabolic Panel   Result Value Ref Range    Sodium 135 133 - 143 mmol/L    Potassium 3.6 3.5 - 5.1 mmol/L    Chloride 106 101 - 110 mmol/L    CO2 26 21 - 32 mmol/L    Anion Gap 3 2 - 11 mmol/L    Glucose 101 (H) 65 - 100

## 2023-04-10 NOTE — ED TRIAGE NOTES
Pt arrives via pov ambulatory c/o involuntary muscle movements and tremors. Pt seen here for the same thing recently. Pt denies any discomfort. Denies substance abuse.

## 2023-04-10 NOTE — ED NOTES
Report given to [facility] GARCIA to assume care at this time.      Hossein Orosco RN  04/10/23 [id number]

## 2023-04-10 NOTE — CONSULTS
Neurology Consult Note       History: This is a very pleasant 53-year-old man with a past medical history of diabetes, polysubstance use disorder, hypertension, and generalized anxiety disorder. He presents with 2 weeks of abnormal movements that are \"dance like\". These movements only stop when he is asleep. No one in the family has similar symptoms. He states that he has been away from drugs for 3 weeks now. Exam: Pertinent positives/negatives on examination    Prominent choreiform movements involving all 4 limbs which are not distractible and he does not have entrainment. Muscle stretch reflexes are normal.  Strength is normal.  Cognition is normal.  Cranial nerve examination is normal.  Choreiform movements persist during ambulation. 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 Differential diagnosis for chorea is broad. Amphetamines and cocaine can both cause chorea and the patient had been abusing these drugs in close proximity to symptom onset. Otherwise, autoimmune etiologies, vitamin deficiencies, antiphospholipid antibody syndrome, and many other etiologies can cause chorea. I will send various panels looking into these disorders. In addition, I will start the patient on a second generation antipsychotic, olanzapine, which has been shown to be helpful for chorea. There are many other treatment options that can be tried as well. In addition, I have ordered an MRI of the brain for further investigation. Cumulative time spent today was 55 minutes which included chart review, obtaining history (from patient, family, or other providers), review of images, examining the patient, and counseling the patient and/or family on medical condition.

## 2023-04-11 LAB
ANION GAP SERPL CALC-SCNC: 4 MMOL/L (ref 2–11)
BASOPHILS # BLD: 0 K/UL (ref 0–0.2)
BASOPHILS NFR BLD: 0 % (ref 0–2)
BUN SERPL-MCNC: 17 MG/DL (ref 6–23)
CALCIUM SERPL-MCNC: 9.1 MG/DL (ref 8.3–10.4)
CHLORIDE SERPL-SCNC: 109 MMOL/L (ref 101–110)
CO2 SERPL-SCNC: 24 MMOL/L (ref 21–32)
CREAT SERPL-MCNC: 1.2 MG/DL (ref 0.8–1.5)
DIFFERENTIAL METHOD BLD: ABNORMAL
EOSINOPHIL # BLD: 0.5 K/UL (ref 0–0.8)
EOSINOPHIL NFR BLD: 5 % (ref 0.5–7.8)
ERYTHROCYTE [DISTWIDTH] IN BLOOD BY AUTOMATED COUNT: 14.3 % (ref 11.9–14.6)
GLUCOSE BLD STRIP.AUTO-MCNC: 100 MG/DL (ref 65–100)
GLUCOSE BLD STRIP.AUTO-MCNC: 118 MG/DL (ref 65–100)
GLUCOSE BLD STRIP.AUTO-MCNC: 79 MG/DL (ref 65–100)
GLUCOSE BLD STRIP.AUTO-MCNC: 89 MG/DL (ref 65–100)
GLUCOSE BLD STRIP.AUTO-MCNC: 97 MG/DL (ref 65–100)
GLUCOSE SERPL-MCNC: 131 MG/DL (ref 65–100)
HCT VFR BLD AUTO: 38.7 % (ref 41.1–50.3)
HGB BLD-MCNC: 12.6 G/DL (ref 13.6–17.2)
IMM GRANULOCYTES # BLD AUTO: 0 K/UL (ref 0–0.5)
IMM GRANULOCYTES NFR BLD AUTO: 0 % (ref 0–5)
LYMPHOCYTES # BLD: 2.8 K/UL (ref 0.5–4.6)
LYMPHOCYTES NFR BLD: 29 % (ref 13–44)
MCH RBC QN AUTO: 28.7 PG (ref 26.1–32.9)
MCHC RBC AUTO-ENTMCNC: 32.6 G/DL (ref 31.4–35)
MCV RBC AUTO: 88.2 FL (ref 82–102)
MONOCYTES # BLD: 1 K/UL (ref 0.1–1.3)
MONOCYTES NFR BLD: 11 % (ref 4–12)
NEUTS SEG # BLD: 5.2 K/UL (ref 1.7–8.2)
NEUTS SEG NFR BLD: 55 % (ref 43–78)
NRBC # BLD: 0 K/UL (ref 0–0.2)
PLATELET # BLD AUTO: 335 K/UL (ref 150–450)
PMV BLD AUTO: 10.5 FL (ref 9.4–12.3)
POTASSIUM SERPL-SCNC: 3.8 MMOL/L (ref 3.5–5.1)
RBC # BLD AUTO: 4.39 M/UL (ref 4.23–5.6)
SERVICE CMNT-IMP: ABNORMAL
SERVICE CMNT-IMP: NORMAL
SODIUM SERPL-SCNC: 137 MMOL/L (ref 133–143)
WBC # BLD AUTO: 9.5 K/UL (ref 4.3–11.1)

## 2023-04-11 PROCEDURE — 82390 ASSAY OF CERULOPLASMIN: CPT

## 2023-04-11 PROCEDURE — 2580000003 HC RX 258: Performed by: FAMILY MEDICINE

## 2023-04-11 PROCEDURE — 80048 BASIC METABOLIC PNL TOTAL CA: CPT

## 2023-04-11 PROCEDURE — 82525 ASSAY OF COPPER: CPT

## 2023-04-11 PROCEDURE — 36415 COLL VENOUS BLD VENIPUNCTURE: CPT

## 2023-04-11 PROCEDURE — 97530 THERAPEUTIC ACTIVITIES: CPT

## 2023-04-11 PROCEDURE — 6370000000 HC RX 637 (ALT 250 FOR IP): Performed by: FAMILY MEDICINE

## 2023-04-11 PROCEDURE — 6370000000 HC RX 637 (ALT 250 FOR IP): Performed by: NURSE PRACTITIONER

## 2023-04-11 PROCEDURE — 1100000000 HC RM PRIVATE

## 2023-04-11 PROCEDURE — 97535 SELF CARE MNGMENT TRAINING: CPT

## 2023-04-11 PROCEDURE — 97162 PT EVAL MOD COMPLEX 30 MIN: CPT

## 2023-04-11 PROCEDURE — 2500000003 HC RX 250 WO HCPCS: Performed by: FAMILY MEDICINE

## 2023-04-11 PROCEDURE — APPSS45 APP SPLIT SHARED TIME 31-45 MINUTES: Performed by: NURSE PRACTITIONER

## 2023-04-11 PROCEDURE — 85025 COMPLETE CBC W/AUTO DIFF WBC: CPT

## 2023-04-11 PROCEDURE — 97166 OT EVAL MOD COMPLEX 45 MIN: CPT

## 2023-04-11 PROCEDURE — 99232 SBSQ HOSP IP/OBS MODERATE 35: CPT | Performed by: PSYCHIATRY & NEUROLOGY

## 2023-04-11 RX ORDER — OLANZAPINE 5 MG/1
7.5 TABLET ORAL NIGHTLY
Status: DISCONTINUED | OUTPATIENT
Start: 2023-04-11 | End: 2023-04-12

## 2023-04-11 RX ADMIN — SODIUM CHLORIDE, PRESERVATIVE FREE 10 ML: 5 INJECTION INTRAVENOUS at 21:18

## 2023-04-11 RX ADMIN — INSULIN GLARGINE 25 UNITS: 100 INJECTION, SOLUTION SUBCUTANEOUS at 09:34

## 2023-04-11 RX ADMIN — OLANZAPINE 7.5 MG: 5 TABLET, FILM COATED ORAL at 21:16

## 2023-04-11 RX ADMIN — SODIUM CHLORIDE, PRESERVATIVE FREE 5 ML: 5 INJECTION INTRAVENOUS at 09:38

## 2023-04-11 RX ADMIN — SODIUM CHLORIDE, PRESERVATIVE FREE 10 ML: 5 INJECTION INTRAVENOUS at 00:09

## 2023-04-11 RX ADMIN — TUBERCULIN PURIFIED PROTEIN DERIVATIVE 5 UNITS: 5 INJECTION, SOLUTION INTRADERMAL at 00:30

## 2023-04-11 ASSESSMENT — PAIN SCALES - GENERAL: PAINLEVEL_OUTOF10: 0

## 2023-04-11 NOTE — PLAN OF CARE
Problem: Discharge Planning  Goal: Discharge to home or other facility with appropriate resources  4/11/2023 1055 by Ida Martinez RN  Outcome: Progressing  Flowsheets (Taken 4/11/2023 0021 by Song Lucas RN)  Discharge to home or other facility with appropriate resources:   Identify barriers to discharge with patient and caregiver   Refer to discharge planning if patient needs post-hospital services based on physician order or complex needs related to functional status, cognitive ability or social support system  4/11/2023 0016 by Song Lucas RN  Outcome: Progressing  Flowsheets (Taken 4/10/2023 2345)  Discharge to home or other facility with appropriate resources:   Identify barriers to discharge with patient and caregiver   Refer to discharge planning if patient needs post-hospital services based on physician order or complex needs related to functional status, cognitive ability or social support system     Problem: Pain  Goal: Verbalizes/displays adequate comfort level or baseline comfort level  4/11/2023 1055 by Ida Martinez RN  Outcome: Progressing  4/11/2023 0016 by Song Lucas RN  Outcome: Progressing  Flowsheets (Taken 4/10/2023 2345)  Verbalizes/displays adequate comfort level or baseline comfort level:   Encourage patient to monitor pain and request assistance   Assess pain using appropriate pain scale     Problem: Safety - Adult  Goal: Free from fall injury  4/11/2023 1055 by Ida Martinez RN  Outcome: Progressing  4/11/2023 0016 by Song Lucas RN  Outcome: Progressing  Flowsheets (Taken 4/11/2023 0012)  Free From Fall Injury:   Instruct family/caregiver on patient safety   Based on caregiver fall risk screen, instruct family/caregiver to ask for assistance with transferring infant if caregiver noted to have fall risk factors     Problem: ABCDS Injury Assessment  Goal: Absence of physical injury  4/11/2023 1055 by Ida Martinez RN  Outcome: Progressing  4/11/2023 0016 by Song Lucas RN  Outcome:
Problem: ABCDS Injury Assessment  Goal: Absence of physical injury  4/11/2023 1100 by David Lantigua RN  Outcome: Progressing  4/11/2023 1055 by David Lantigua RN  Outcome: Progressing  4/11/2023 0016 by Shelley Dumont RN  Outcome: Progressing

## 2023-04-11 NOTE — H&P
pressure 109/75, pulse (!) 110, temperature 99.5 °F (37.5 °C), temperature source Oral, resp. rate 20, height 6' (1.829 m), weight 215 lb (97.5 kg), SpO2 100 %. General:    Well nourished. Head:  Normocephalic, atraumatic  Eyes:  Sclerae appear normal.  Pupils equally round. ENT:  Nares appear normal.  Moist oral mucosa  Neck:  No restricted ROM. Trachea midline   CV:   RRR. Lungs:   CTAB. No wheezing, rhonchi, or rales. Symmetric expansion. Abdomen:   Soft, nontender, nondistended. Extremities: Constantly moving in bed. Skin:     No rashes and normal coloration. Warm and dry. Neuro:  Constantly moving. Choreiform movements of all extremities. Psych:  Normal mood and affect.       I have personally reviewed labs and tests:  Recent Labs:  Recent Results (from the past 24 hour(s))   CBC with Auto Differential    Collection Time: 04/10/23  4:27 PM   Result Value Ref Range    WBC 12.9 (H) 4.3 - 11.1 K/uL    RBC 4.69 4.23 - 5.6 M/uL    Hemoglobin 13.7 13.6 - 17.2 g/dL    Hematocrit 40.7 (L) 41.1 - 50.3 %    MCV 86.8 82 - 102 FL    MCH 29.2 26.1 - 32.9 PG    MCHC 33.7 31.4 - 35.0 g/dL    RDW 14.2 11.9 - 14.6 %    Platelets 226 976 - 604 K/uL    MPV 10.4 9.4 - 12.3 FL    nRBC 0.00 0.0 - 0.2 K/uL    Differential Type AUTOMATED      Seg Neutrophils 65 43 - 78 %    Lymphocytes 23 13 - 44 %    Monocytes 9 4.0 - 12.0 %    Eosinophils % 2 0.5 - 7.8 %    Basophils 1 0.0 - 2.0 %    Immature Granulocytes 0 0.0 - 5.0 %    Segs Absolute 8.4 (H) 1.7 - 8.2 K/UL    Absolute Lymph # 3.0 0.5 - 4.6 K/UL    Absolute Mono # 1.1 0.1 - 1.3 K/UL    Absolute Eos # 0.3 0.0 - 0.8 K/UL    Basophils Absolute 0.1 0.0 - 0.2 K/UL    Absolute Immature Granulocyte 0.0 0.0 - 0.5 K/UL   Comprehensive Metabolic Panel    Collection Time: 04/10/23  4:27 PM   Result Value Ref Range    Sodium 135 133 - 143 mmol/L    Potassium 3.6 3.5 - 5.1 mmol/L    Chloride 106 101 - 110 mmol/L    CO2 26 21 - 32 mmol/L    Anion Gap 3 2 - 11 mmol/L

## 2023-04-11 NOTE — ED NOTES
TRANSFER - OUT REPORT:    Verbal report given to RN on Manuel Orosco  being transferred to 7th floor for routine progression of patient care       Report consisted of patient's Situation, Background, Assessment and   Recommendations(SBAR). Information from the following report(s) ED SBAR was reviewed with the receiving nurse. [place] Assessment: Presents to emergency department  because of falls (Syncope, seizure, or loss of consciousness): No, Age > 79: No, Altered Mental Status, Intoxication with alcohol or substance confusion (Disorientation, impaired judgment, poor safety awaremess, or inability to follow instructions): No, Impaired Mobility: Ambulates or transfers with assistive devices or assistance; Unable to ambulate or transer.: No  Lines:   Peripheral IV 04/10/23 Left Antecubital (Active)        Opportunity for questions and clarification was provided.       Patient transported with:  Brisa Walden RN  04/10/23 9340

## 2023-04-11 NOTE — FLOWSHEET NOTE
04/10/23 2341   Dual Clinician Skin Assessment   Dual Skin Assessment (4 Eyes) WDL   Second Clinical  (First and Last Name) Lucianne Libman RN   Skin Integumentary    Skin Integumentary (WDL) X   Skin Color Ashen   Skin Condition/Temp Warm;Dry;Flaky   Skin Integrity Other (comment)  (raised area on buttocks)   Location buttocks   Wound Prevention/Protection Method Yes   Skin Fold Management No   Nails X   Wound Offloading (Prevention Methods) Pillows   Dressing Present  No   Location of Wound Prevention Sacrum   Orientation of Wound Prevention Posterior   Nail Condition Thick

## 2023-04-12 LAB
ANA SER QL: NEGATIVE
CARDIOLIPIN AB IGM: NORMAL
CARDIOLIPIN AB, IGA: NORMAL
CARDIOLIPIN ANTIBODY, IGG: NORMAL
CERULOPLASMIN SERPL-MCNC: 30.8 MG/DL (ref 16–31)
COPPER SERPL-MCNC: 130 UG/DL (ref 69–132)
GLUCOSE BLD STRIP.AUTO-MCNC: 75 MG/DL (ref 65–100)
GLUCOSE BLD STRIP.AUTO-MCNC: 86 MG/DL (ref 65–100)
GLUCOSE BLD STRIP.AUTO-MCNC: 90 MG/DL (ref 65–100)
GLUCOSE BLD STRIP.AUTO-MCNC: 91 MG/DL (ref 65–100)
INTERPRETATION: NORMAL
Lab: NORMAL
Lab: NORMAL
MM INDURATION, POC: 0 MM (ref 0–5)
PHOSPHATIDYLGLYCEROL AB, IGA: NORMAL
PHOSPHATIDYLGLYCEROL AB, IGG: NORMAL
PHOSPHATIDYLGLYCEROL AB, IGM: NORMAL
PHOSPHATIDYLINOSITOL AB, IGA: NORMAL
PHOSPHATIDYLINOSITOL AB, IGG: NORMAL
PHOSPHATIDYLINOSITOL AB, IGM: NORMAL
PHOSPHATIDYLSERINE IGA ANTIBODY: NORMAL
PHOSPHATIDYLSERINE IGG ANTIBODY: NORMAL
PHOSPHATIDYLSERINE IGM ANTIBODY: NORMAL
PPD, POC: NEGATIVE
SERVICE CMNT-IMP: NORMAL
SPECIMEN STATUS: NORMAL

## 2023-04-12 PROCEDURE — 97530 THERAPEUTIC ACTIVITIES: CPT

## 2023-04-12 PROCEDURE — 1100000000 HC RM PRIVATE

## 2023-04-12 PROCEDURE — 99231 SBSQ HOSP IP/OBS SF/LOW 25: CPT | Performed by: PSYCHIATRY & NEUROLOGY

## 2023-04-12 PROCEDURE — 2580000003 HC RX 258: Performed by: FAMILY MEDICINE

## 2023-04-12 PROCEDURE — 82962 GLUCOSE BLOOD TEST: CPT

## 2023-04-12 PROCEDURE — 6370000000 HC RX 637 (ALT 250 FOR IP): Performed by: FAMILY MEDICINE

## 2023-04-12 PROCEDURE — 6370000000 HC RX 637 (ALT 250 FOR IP): Performed by: PSYCHIATRY & NEUROLOGY

## 2023-04-12 RX ORDER — OLANZAPINE 5 MG/1
10 TABLET ORAL NIGHTLY
Status: DISCONTINUED | OUTPATIENT
Start: 2023-04-12 | End: 2023-04-14 | Stop reason: HOSPADM

## 2023-04-12 RX ORDER — LEVETIRACETAM 500 MG/1
500 TABLET ORAL 2 TIMES DAILY
Status: DISCONTINUED | OUTPATIENT
Start: 2023-04-12 | End: 2023-04-14 | Stop reason: HOSPADM

## 2023-04-12 RX ADMIN — OLANZAPINE 10 MG: 5 TABLET, FILM COATED ORAL at 21:38

## 2023-04-12 RX ADMIN — SODIUM CHLORIDE, PRESERVATIVE FREE 10 ML: 5 INJECTION INTRAVENOUS at 21:38

## 2023-04-12 RX ADMIN — INSULIN GLARGINE 25 UNITS: 100 INJECTION, SOLUTION SUBCUTANEOUS at 09:26

## 2023-04-12 RX ADMIN — LEVETIRACETAM 500 MG: 500 TABLET, FILM COATED ORAL at 21:38

## 2023-04-12 RX ADMIN — LEVETIRACETAM 500 MG: 500 TABLET, FILM COATED ORAL at 12:16

## 2023-04-12 ASSESSMENT — PAIN SCALES - GENERAL
PAINLEVEL_OUTOF10: 0
PAINLEVEL_OUTOF10: 0

## 2023-04-12 NOTE — CARE COORDINATION
CM following for potential discharge needs. Therapy recommendations reviewed and patient's discharge plan reviewed with multiple disciplines. Referral sent to [facility]th [facility] for review as patient appears to meet criteria. If accepted, patient would require insurance authorization prior to discharge and Covid testing within 72 hours of admission. Patient may possibly choose to discharge home with [facility]. CM will continue to follow for ongoing discharge planning. 04/12/23 8338   Service Assessment   Patient Orientation Alert and Oriented   Cognition Alert   History Provided By Medical Record   Primary Caregiver Self   Accompanied By/Relationship N/A   Support Systems Spouse/Significant Other   Patient's Healthcare Decision Maker is: Legal Next of Kin   PCP Verified by CM Yes   Prior Functional Level Independent in ADLs/IADLs   Current Functional Level Assistance with the following:;Mobility; Shopping;Housework   Can patient return to prior living arrangement Unknown at present   Ability to make needs known: Good   Family able to assist with home care needs: Yes   Would you like for me to discuss the discharge plan with any other family members/significant others, and if so, who? No   Financial Resources Other (Comment)  (Com[employer])   Community Resources None   CM/SW Referral Other (see comment)  (No CM consult)   Discharge Planning   Patient expects to be discharged to:  Unknown  ([facility] versus )

## 2023-04-13 LAB
ANION GAP SERPL CALC-SCNC: 1 MMOL/L (ref 2–11)
BASOPHILS # BLD: 0.1 K/UL (ref 0–0.2)
BASOPHILS NFR BLD: 1 % (ref 0–2)
BUN SERPL-MCNC: 18 MG/DL (ref 6–23)
CALCIUM SERPL-MCNC: 9.3 MG/DL (ref 8.3–10.4)
CHLORIDE SERPL-SCNC: 108 MMOL/L (ref 101–110)
CO2 SERPL-SCNC: 27 MMOL/L (ref 21–32)
CREAT SERPL-MCNC: 1.3 MG/DL (ref 0.8–1.5)
DIFFERENTIAL METHOD BLD: ABNORMAL
ENDOMYSIUM IGA SER QL: NEGATIVE
EOSINOPHIL # BLD: 0.4 K/UL (ref 0–0.8)
EOSINOPHIL NFR BLD: 5 % (ref 0.5–7.8)
ERYTHROCYTE [DISTWIDTH] IN BLOOD BY AUTOMATED COUNT: 14.3 % (ref 11.9–14.6)
GLUCOSE BLD STRIP.AUTO-MCNC: 100 MG/DL (ref 65–100)
GLUCOSE BLD STRIP.AUTO-MCNC: 68 MG/DL (ref 65–100)
GLUCOSE BLD STRIP.AUTO-MCNC: 83 MG/DL (ref 65–100)
GLUCOSE BLD STRIP.AUTO-MCNC: 86 MG/DL (ref 65–100)
GLUCOSE BLD STRIP.AUTO-MCNC: 87 MG/DL (ref 65–100)
GLUCOSE SERPL-MCNC: 97 MG/DL (ref 65–100)
HCT VFR BLD AUTO: 38.2 % (ref 41.1–50.3)
HGB BLD-MCNC: 12.4 G/DL (ref 13.6–17.2)
IGA SERPL-MCNC: 440 MG/DL (ref 90–386)
IMM GRANULOCYTES # BLD AUTO: 0 K/UL (ref 0–0.5)
IMM GRANULOCYTES NFR BLD AUTO: 0 % (ref 0–5)
LYMPHOCYTES # BLD: 2.5 K/UL (ref 0.5–4.6)
LYMPHOCYTES NFR BLD: 32 % (ref 13–44)
MCH RBC QN AUTO: 28.9 PG (ref 26.1–32.9)
MCHC RBC AUTO-ENTMCNC: 32.5 G/DL (ref 31.4–35)
MCV RBC AUTO: 89 FL (ref 82–102)
MM INDURATION, POC: 0 MM (ref 0–5)
MONOCYTES # BLD: 0.8 K/UL (ref 0.1–1.3)
MONOCYTES NFR BLD: 11 % (ref 4–12)
NEUTS SEG # BLD: 4 K/UL (ref 1.7–8.2)
NEUTS SEG NFR BLD: 51 % (ref 43–78)
NRBC # BLD: 0 K/UL (ref 0–0.2)
PLATELET # BLD AUTO: 369 K/UL (ref 150–450)
PMV BLD AUTO: 10.3 FL (ref 9.4–12.3)
POTASSIUM SERPL-SCNC: 3.7 MMOL/L (ref 3.5–5.1)
PPD, POC: NEGATIVE
RBC # BLD AUTO: 4.29 M/UL (ref 4.23–5.6)
SERVICE CMNT-IMP: NORMAL
SODIUM SERPL-SCNC: 136 MMOL/L (ref 133–143)
TTG IGA SER-ACNC: <2 U/ML (ref 0–3)
WBC # BLD AUTO: 7.8 K/UL (ref 4.3–11.1)

## 2023-04-13 PROCEDURE — 80048 BASIC METABOLIC PNL TOTAL CA: CPT

## 2023-04-13 PROCEDURE — 2580000003 HC RX 258: Performed by: FAMILY MEDICINE

## 2023-04-13 PROCEDURE — 6370000000 HC RX 637 (ALT 250 FOR IP): Performed by: PSYCHIATRY & NEUROLOGY

## 2023-04-13 PROCEDURE — 36415 COLL VENOUS BLD VENIPUNCTURE: CPT

## 2023-04-13 PROCEDURE — 99232 SBSQ HOSP IP/OBS MODERATE 35: CPT | Performed by: NURSE PRACTITIONER

## 2023-04-13 PROCEDURE — 6370000000 HC RX 637 (ALT 250 FOR IP): Performed by: FAMILY MEDICINE

## 2023-04-13 PROCEDURE — 85025 COMPLETE CBC W/AUTO DIFF WBC: CPT

## 2023-04-13 PROCEDURE — 1100000000 HC RM PRIVATE

## 2023-04-13 PROCEDURE — 97535 SELF CARE MNGMENT TRAINING: CPT

## 2023-04-13 PROCEDURE — 82962 GLUCOSE BLOOD TEST: CPT

## 2023-04-13 RX ORDER — INSULIN GLARGINE 100 [IU]/ML
15 INJECTION, SOLUTION SUBCUTANEOUS ONCE
Status: COMPLETED | OUTPATIENT
Start: 2023-04-13 | End: 2023-04-13

## 2023-04-13 RX ORDER — INSULIN GLARGINE 100 [IU]/ML
15 INJECTION, SOLUTION SUBCUTANEOUS EVERY MORNING
Status: DISCONTINUED | OUTPATIENT
Start: 2023-04-14 | End: 2023-04-14 | Stop reason: HOSPADM

## 2023-04-13 RX ADMIN — INSULIN GLARGINE 15 UNITS: 100 INJECTION, SOLUTION SUBCUTANEOUS at 09:31

## 2023-04-13 RX ADMIN — LEVETIRACETAM 500 MG: 500 TABLET, FILM COATED ORAL at 09:31

## 2023-04-13 RX ADMIN — LEVETIRACETAM 500 MG: 500 TABLET, FILM COATED ORAL at 21:03

## 2023-04-13 RX ADMIN — OLANZAPINE 10 MG: 5 TABLET, FILM COATED ORAL at 21:02

## 2023-04-13 RX ADMIN — SODIUM CHLORIDE, PRESERVATIVE FREE 10 ML: 5 INJECTION INTRAVENOUS at 21:04

## 2023-04-13 ASSESSMENT — PAIN SCALES - GENERAL: PAINLEVEL_OUTOF10: 0

## 2023-04-13 NOTE — CARE COORDINATION
Call received from 9th floor [facility] liaison, Makalya Ramirez, with update that they have accepted patient for admission and will initiate insurance authorization today. 9th floor liaison, Pantera Mars, should be coming to talk with patient today as well.

## 2023-04-14 ENCOUNTER — TELEPHONE (OUTPATIENT)
Dept: NEUROLOGY | Age: 53
End: 2023-04-14

## 2023-04-14 VITALS
OXYGEN SATURATION: 98 % | TEMPERATURE: 98.8 F | RESPIRATION RATE: 18 BRPM | DIASTOLIC BLOOD PRESSURE: 82 MMHG | SYSTOLIC BLOOD PRESSURE: 121 MMHG | BODY MASS INDEX: 29.12 KG/M2 | WEIGHT: 215 LBS | HEART RATE: 65 BPM | HEIGHT: 72 IN

## 2023-04-14 LAB
GLUCOSE BLD STRIP.AUTO-MCNC: 85 MG/DL (ref 65–100)
GLUCOSE BLD STRIP.AUTO-MCNC: 87 MG/DL (ref 65–100)
Lab: NORMAL
Lab: NORMAL
REFERENCE LAB: NORMAL
SERVICE CMNT-IMP: NORMAL
SERVICE CMNT-IMP: NORMAL

## 2023-04-14 PROCEDURE — 6370000000 HC RX 637 (ALT 250 FOR IP): Performed by: PSYCHIATRY & NEUROLOGY

## 2023-04-14 PROCEDURE — 99231 SBSQ HOSP IP/OBS SF/LOW 25: CPT | Performed by: PSYCHIATRY & NEUROLOGY

## 2023-04-14 PROCEDURE — 97530 THERAPEUTIC ACTIVITIES: CPT

## 2023-04-14 PROCEDURE — 82962 GLUCOSE BLOOD TEST: CPT

## 2023-04-14 RX ORDER — LEVETIRACETAM 500 MG/1
500 TABLET ORAL 2 TIMES DAILY
Qty: 60 TABLET | Refills: 0 | Status: SHIPPED | OUTPATIENT
Start: 2023-04-14

## 2023-04-14 RX ORDER — INSULIN GLARGINE-YFGN 100 [IU]/ML
10 INJECTION, SOLUTION SUBCUTANEOUS
Qty: 500 ML | Refills: 0 | Status: SHIPPED | OUTPATIENT
Start: 2023-04-14 | End: 2023-05-14

## 2023-04-14 RX ORDER — INSULIN LISPRO 200 [IU]/ML
8 INJECTION, SOLUTION SUBCUTANEOUS
Qty: 15 ADJUSTABLE DOSE PRE-FILLED PEN SYRINGE | Refills: 5
Start: 2023-04-14

## 2023-04-14 RX ORDER — OLANZAPINE 10 MG/1
10 TABLET ORAL NIGHTLY
Qty: 30 TABLET | Refills: 0 | Status: SHIPPED | OUTPATIENT
Start: 2023-04-14 | End: 2023-04-14 | Stop reason: SDUPTHER

## 2023-04-14 RX ADMIN — LEVETIRACETAM 500 MG: 500 TABLET, FILM COATED ORAL at 09:05

## 2023-04-14 ASSESSMENT — PAIN SCALES - GENERAL: PAINLEVEL_OUTOF10: 0

## 2023-04-14 NOTE — PROGRESS NOTES
ACUTE OCCUPATIONAL THERAPY GOALS:   (Developed with and agreed upon by patient and/or caregiver.)  1. Pt will toilet with SBA   2. Pt will complete functional mobility for ADLs with SBA  3. Pt will complete lower body dressing with SBA using AE as needed  4. Pt will complete grooming and hygiene at sink with SBA  5. Pt will demonstrate independence with HEP to promote increased BUE strength and functional use for ADLs  6. Pt will complete UB ADLs with set up    Timeframe: 7 visits      OCCUPATIONAL THERAPY Initial Assessment and Daily Note       OT Visit Days: 1  Acknowledge Orders  Time  OT Charge Capture  Rehab Caseload Tracker      Rachel Boyle is a 50 y.o. male   PRIMARY DIAGNOSIS: Chorea  Choreiform movement [R25.8]  Chorea [G25.5]       Reason for Referral: Other lack of cordination (R27.8)  Inpatient: Payor: Brionna Martinez / Plan: Archie Redo / Product Type: *No Product type* /     ASSESSMENT:     REHAB RECOMMENDATIONS:   Recommendation to date pending progress:  Setting:  Inpatient Rehab Facility    Equipment:    To Be Determined     ASSESSMENT:  Mr. Oni Singletary was admitted with choreiform movements. Pt has a recent diagnosis of diabetes and MRI + changes consistent with hyperglycemia. Pt also has a hx of polysubstance abuse. Pt presented with deficits in fine and gross motor coordination, mobility, and balance impacting ADLs. Pt with severe involuntary movements, however able to compensate well and demonstrated ADLs and functional mobility for ADLs with min A overall. Pt is below his functional baseline and would benefit from skilled OT services to address deficits.       [employer] AM-PAC 6 Clicks Daily Activity Inpatient Short Form:    AM-PAC Daily Activity - Inpatient   How much help is needed for putting on and taking off regular lower body clothing?: A Little  How much help is needed for bathing (which includes washing, rinsing, drying)?: A Little  How much help is needed for toileting (which
ACUTE PHYSICAL THERAPY GOALS:   (Developed with and agreed upon by patient and/or caregiver.)    (1.) Gila Garrido  will move from supine to sit and sit to supine  with INDEPENDENCE within 7 treatment day(s). (2.) Gila Garrido will transfer from bed to chair and chair to bed with STAND BY ASSIST using the least restrictive device within 7 treatment day(s). (3.) Gila Garrido will ambulate with STAND BY ASSIST for 250 feet with the least restrictive device within 7 treatment day(s). (4.) Gila Garrido will perform standing static and dynamic balance activities x 20 minutes with STAND BY ASSIST to improve safety within 7 treatment day(s). (5.) Gila Garrido will ascend and descend 6 stairs using 1 hand rail(s) with MINIMAL ASSIST to improve functional mobility and safety within 7 treatment day(s). (6.) Gila Garrido will perform bilateral lower extremity exercises x 20 min for HEP with INDEPENDENCE to improve strength, endurance, and functional mobility within 7 treatment day(s). PHYSICAL THERAPY: Daily Note PM   (Link to Caseload Tracking: PT Visit Days : 2  Time In/Out PT Charge Capture  Rehab Caseload Tracker  Orders    Gila Garrido is a 50 y.o. male   PRIMARY DIAGNOSIS: Chorea  Choreiform movement [R25.8]  Chorea [G25.5]       Inpatient: Payor: [facility] / Plan: [address] [place] [address] / Product Type: *No Product type* /     ASSESSMENT:     REHAB RECOMMENDATIONS:   Recommendation to date pending progress:  Setting:  Inpatient Rehab Facility    Equipment:    To Be Determined     ASSESSMENT:  Mr. Yassine Henderson was supine upon bed and eager/agreeable for PT. Uncontrollable jerking/tremors noted in all 4 extremities. Patient transferred from supine to EOB SBA. Ambulated [de-identified]' CGA-min assist with increased trunk sway, scissoring, steppage gait and wide/narrow KOBE at times. Patient compensates well for uncontrollable jerking motions during gait. Patient returned to EOB to rest and then ambulated an additional 120'.
ACUTE PHYSICAL THERAPY GOALS:   (Developed with and agreed upon by patient and/or caregiver.)    (1.) Mustapha Franco  will move from supine to sit and sit to supine  with INDEPENDENCE within 7 treatment day(s). (2.) Mustapha Franco will transfer from bed to chair and chair to bed with STAND BY ASSIST using the least restrictive device within 7 treatment day(s). (3.) Mustapha Franco will ambulate with STAND BY ASSIST for 250 feet with the least restrictive device within 7 treatment day(s). (4.) Mustapha Franco will perform standing static and dynamic balance activities x 20 minutes with STAND BY ASSIST to improve safety within 7 treatment day(s). (5.) Mustapha Franco will ascend and descend 6 stairs using 1 hand rail(s) with MINIMAL ASSIST to improve functional mobility and safety within 7 treatment day(s). (6.) Mustapha Franco will perform bilateral lower extremity exercises x 20 min for HEP with INDEPENDENCE to improve strength, endurance, and functional mobility within 7 treatment day(s). PHYSICAL THERAPY: Daily Note PM   (Link to Caseload Tracking: PT Visit Days : 3  Time In/Out PT Charge Capture  Rehab Caseload Tracker  Orders    Mustapha Franco is a 50 y.o. male   PRIMARY DIAGNOSIS: Chorea  Choreiform movement [R25.8]  Chorea [G25.5]       Inpatient: Payor: Marcos Schofield / Plan: [employer] [place] / Product Type: *No Product type* /     ASSESSMENT:     REHAB RECOMMENDATIONS:   Recommendation to date pending progress:  Setting:  Inpatient Rehab Facility    Equipment:    To Be Determined     ASSESSMENT:  Mr. Gisele Godoy was supine upon contact and agreeable to PT; motivated to participate. Patient performed supine to sit with supervision and transfer to standing with SBA. Once standing patient ambulated 250' without use of assistive device with uncontrollable jerking movements noted but compensates well being able to correct LOB. Patient returned to EOB where he was instructed in LE exercises focusing on controlled movements.
ACUTE PHYSICAL THERAPY GOALS:   (Developed with and agreed upon by patient and/or caregiver.)    (1.) Rachel Boyle  will move from supine to sit and sit to supine  with INDEPENDENCE within 7 treatment day(s). (2.) Rachel Boyle will transfer from bed to chair and chair to bed with STAND BY ASSIST using the least restrictive device within 7 treatment day(s). (3.) Rachel Boyle will ambulate with STAND BY ASSIST for 250 feet with the least restrictive device within 7 treatment day(s). (4.) Rachel Boyle will perform standing static and dynamic balance activities x 20 minutes with STAND BY ASSIST to improve safety within 7 treatment day(s). (5.) Rachel Boyle will ascend and descend 6 stairs using 1 hand rail(s) with MINIMAL ASSIST to improve functional mobility and safety within 7 treatment day(s). (6.) Rachel Boyle will perform bilateral lower extremity exercises x 20 min for HEP with INDEPENDENCE to improve strength, endurance, and functional mobility within 7 treatment day(s). PHYSICAL THERAPY Initial Assessment, Daily Note, and PM  (Link to Caseload Tracking: PT Visit Days : 1  Acknowledge Orders  Time In/Out  PT Charge Capture  Rehab Caseload Tracker    Rachel Boyle is a 50 y.o. male   PRIMARY DIAGNOSIS: Chorea  Choreiform movement [R25.8]  Chorea [G25.5]       Reason for Referral: Other abnormalities of gait and mobility (R26.89)  Unspecified Lack of Coordination (R27.9)  Inpatient: Payor: Brionna Martinez / Plan: Archie Padgett / Product Type: *No Product type* /     ASSESSMENT:     REHAB RECOMMENDATIONS:   Recommendation to date pending progress:  Setting:  Inpatient Rehab Facility    Equipment:    To Be Determined     ASSESSMENT:  Mr. Oni Singletary is a 50year old M who presents chorea that has been going on for 2 weeks. Reports these movements are happening not all the time but very often. At baseline, pt is independent. This date pt performs mobility including bed mobility with CGA.  He was able to
Hospitalist Progress Note   Admit Date:  4/10/2023  4:07 PM   Name:  Krystle Acevedo   Age:  50 y.o. Sex:  male  :  1970   MRN:  [id number]   Room:  [facility]/    Presenting/Chief Complaint: Spasms     Reason(s) for Admission: Choreiform movement [R25.8]  Chorea [G25.5]     Hospital Course:   Krystle Acevedo is a 50 y.o. male with medical history of type 2 diabetes mellitus, polysubstance abuse, anxiety and hypertension admitted with chorea. Neurology consulted. Patient started on Zyprexa. MRI showed T2 signal abnormalities within the bilateral caudate nuclei and putamen, which is generally seen with hyperglycemia. Neurologist recommended symptoms likely related to hyperglycemia. A1c more than 14 on 3/4/2023. Cocaine and amphetamine may have been contributing factor as well. Screening labs sent for other etiologies. Subjective & 24hr Events (23): Patient is seen at the bedside. Involuntary movements somewhat improved today. Reports overall he is feeling better. No active complaint. Olanzapine dose increased to 10 mg daily and Keppra added by neurology    Assessment & Plan:     Chorea:  MRI reviewed  Neurology recommend likely secondary to hyperglycemia, typically symptoms resolved over a couple of weeks  Zyprexa dose increased to 10 mg daily and Keppra added. Continue supportive care    Type 2 diabetes mellitus:  Recent A1c more than 14 in 2023  Blood glucose optimally controlled here  Continue Lantus 25 units in a.m. Continue sliding scale    Substance abuse:  Reports he is sober for past 3 weeks    PT/OT evals and PPD needed/ordered? Yes  Diet:  ADULT DIET; Easy to Chew; 4 carb choices (60 gm/meal);  Low Fat/Low Chol/High Fiber/2 gm Na  VTE prophylaxis: Lovenox  Code status: Full Code    Hospital Problems:  Principal Problem:    Chorea  Active Problems:    Polysubstance abuse ([facility] [address]    Type 2 diabetes mellitus with hyperglycemia, with long-term current use of insulin ([address]
Neurology Daily Progress Note     Assessment:     59-year-old man with subacute to acute onset chorea. Etiology is most likely secondary to hyperglycemia. The patient has T2 signal abnormalities within the bilateral caudate nuclei and putamen, which is generally seen with hyperglycemia. It is unusual to have this bilaterally. Will also check heavy metals, which can cause this pattern, which is unlikely. Plan:     Labs pending    Continue Zyprexa. Chorea is much better today. We can consider adding Keppra if movements are not well controlled. As an outpatient, there are better options, including tetrabenazine or deutetrabenazine. Follow up with neurology as an outpatient. Subjective: Interval history:    Patient having intermittent chorea. Feels movements are much better. He has not tried to get up or walk yet. Planning to d/c to [facility]. History:    Boogie Sorto is a 50 y.o. male who is being seen for chorea. Past Medical History:   Diagnosis Date    OMAR (acute kidney injury) ([facility] [address] 2023    Anxiety     Chorea 04/10/2023     ER to Admission    Diabetes mellitus ([facility] [address] 2023    New Onset HgbA1C > 14    Elevated lipase 2023    Hypertension     Marijuana use     Mild cardiomegaly 2023    Seen on CXR    Positive urine drug screen 2023    + cocaine, + amphetamines, + THC     No past surgical history on file.    Family History   Problem Relation Age of Onset    Kidney Disease Mother     Alzheimer's Disease Father     No Known Problems Brother      Social History     Tobacco Use    Smoking status: Former     Years: 20.00     Types: Cigarettes     Quit date: 3/14/2023     Years since quittin.0    Smokeless tobacco: Never   Substance Use Topics    Alcohol use: Not Currently      Current Facility-Administered Medications   Medication Dose Route Frequency Provider Last Rate Last Admin    levETIRAcetam (KEPPRA) tablet 500 mg  500 mg Oral BID Hever Gorman DO   500 mg
TRANSFER - IN REPORT:    Verbal report received from [facility] [place] operated by [facility] on Gila Garrido  being received from ED for routine progression of patient care      Report consisted of patient's Situation, Background, Assessment and   Recommendations(SBAR). Information from the following report(s) ED Encounter Summary was reviewed with the receiving nurse. Opportunity for questions and clarification was provided. Assessment will be completed upon patient's arrival to unit and care assumed.
injection 5-40 mL  5-40 mL IntraVENous 2 times per day Griselda Elders, DO   10 mL at 04/11/23 2118    sodium chloride flush 0.9 % injection 5-40 mL  5-40 mL IntraVENous PRN Griselda Elders, DO        0.9 % sodium chloride infusion   IntraVENous PRN Griselda Elders, DO        polyethylene glycol (GLYCOLAX) packet 17 g  17 g Oral Daily PRN Griselda Elders, DO        acetaminophen (TYLENOL) tablet 650 mg  650 mg Oral Q6H PRN Griselda Elders, DO        Or    acetaminophen (TYLENOL) suppository 650 mg  650 mg Rectal Q6H PRN Griselda Elders, DO        insulin glargine (LANTUS) injection vial 25 Units  25 Units SubCUTAneous QAM Griselda Elders, DO   25 Units at 04/12/23 0926    insulin lispro (HUMALOG) injection vial 0-8 Units  0-8 Units SubCUTAneous TID WC Griselda Elders, DO        insulin lispro (HUMALOG) injection vial 0-4 Units  0-4 Units SubCUTAneous Nightly Griselda Elders, DO        glucose chewable tablet 16 g  4 tablet Oral PRN Griselda Elders, DO        dextrose bolus 10% 125 mL  125 mL IntraVENous PRN Griselda Elders, DO        Or    dextrose bolus 10% 250 mL  250 mL IntraVENous PRN Griselda Elders, DO        glucagon (rDNA) injection 1 mg  1 mg SubCUTAneous PRN Griselda Elders, DO        dextrose 10 % infusion   IntraVENous Continuous PRN Griselda Elders, DO            No Known Allergies    Review of systems negative with exception of pertinent positives and negatives noted above.        Objective:     Vitals:    04/11/23 1941 04/11/23 2327 04/12/23 0329 04/12/23 0749   BP: (!) 125/91 (!) 136/91 114/82 127/86   Pulse: 64 59 68 60   Resp: 20 18 18 18   Temp: 97.5 °F (36.4 °C) 97.3 °F (36.3 °C) 97.7 °F (36.5 °C) 97.9 °F (36.6 °C)   TempSrc:  Oral Oral Oral   SpO2: 99%  100% 97%   Weight:       Height:              Current Facility-Administered Medications:     OLANZapine (ZYPREXA) tablet 7.5 mg, 7.5 mg, Oral, Nightly, Malachy Stain, APRN - NP, 7.5 mg at 04/11/23 2116    sodium chloride flush
[] [] [] [] [x]    Lower Body Dressing [] [] [] [] [] [] [] [] [] [x]    Feeding [] [] [] [] [] [] [] [] [] [x]    Grooming [] [] [] [] [x] [] [] [] [] [] Brushing teeth standing at sink   Personal Device Care [] [] [] [] [] [] [] [] [] [x]    Functional Mobility [] [] [] [] [x] [x] [] [] [] [] Gait belt   I=Independent, Mod I=Modified Independent, S=Supervision/Setup, SBA=Standby Assistance, CGA=Contact Guard Assistance, Min=Minimal Assistance, Mod=Moderate Assistance, Max=Maximal Assistance, Total=Total Assistance, NT=Not Tested    BALANCE: Good Fair+ Fair Fair- Poor NT Comments   Sitting Static [] [x] [] [] [] []    Sitting Dynamic [] [] [x] [] [] []              Standing Static [] [] [x] [] [] []    Standing Dynamic [] [] [x] [] [] []        PLAN:     FREQUENCY/DURATION   OT Plan of Care: 3 times/week for duration of hospital stay or until stated goals are met, whichever comes first.    TREATMENT:     TREATMENT:   Self Care (18 minutes): Patient participated in grooming ADLs in standing with minimal tactile cueing to increase independence, decrease assistance required, increase activity tolerance, and increase safety awareness. Patient also participated in functional mobility and functional transfer training to increase independence, decrease assistance required, increase activity tolerance, and increase safety awareness.      TREATMENT GRID:  N/A    AFTER TREATMENT PRECAUTIONS: Bed, Call light within reach, Needs within reach, and RN notified    INTERDISCIPLINARY COLLABORATION:  RN/ PCT and OT/ DIMAS    EDUCATION:       TOTAL TREATMENT DURATION AND TIME:  Time In: [address]  Time Out: [address]  Minutes: [facility]
Cocaine, Urine Negative NEG      Amphetamine, Urine Negative NEG      Methadone, Urine Negative NEG      THC, TH-Cannabinol, Urine Positive (A) NEG      Opiates, Urine Negative NEG      Barbiturates, Urine Negative NEG     Vitamin B12    Collection Time: 04/10/23  6:58 PM   Result Value Ref Range    Vitamin B-12 747 193 - 986 pg/mL   Miscellaneous Sendout    Collection Time: 04/10/23  6:58 PM   Result Value Ref Range    Test Description:       [facility] TEST ID [id number] AUTOIMMUNE NEUROLOGY COMPREHENSIVE PARANEOPLASTIC PROFILE    Reference Lab [facility]      Results: PENDING    POCT Glucose    Collection Time: 04/10/23 11:50 PM   Result Value Ref Range    POC Glucose 79 65 - 100 mg/dL    Performed by: [email or web address]    Basic Metabolic Panel w/ Reflex to MG    Collection Time: 04/11/23  5:33 AM   Result Value Ref Range    Sodium 137 133 - 143 mmol/L    Potassium 3.8 3.5 - 5.1 mmol/L    Chloride 109 101 - 110 mmol/L    CO2 24 21 - 32 mmol/L    Anion Gap 4 2 - 11 mmol/L    Glucose 131 (H) 65 - 100 mg/dL    BUN 17 6 - 23 MG/DL    Creatinine 1.20 0.8 - 1.5 MG/DL    Est, Glom Filt Rate >60 >60 ml/min/1.73m2    Calcium 9.1 8.3 - 10.4 MG/DL   CBC with Auto Differential    Collection Time: 04/11/23  5:33 AM   Result Value Ref Range    WBC 9.5 4.3 - 11.1 K/uL    RBC 4.39 4.23 - 5.6 M/uL    Hemoglobin 12.6 (L) 13.6 - 17.2 g/dL    Hematocrit 38.7 (L) 41.1 - 50.3 %    MCV 88.2 82 - 102 FL    MCH 28.7 26.1 - 32.9 PG    MCHC 32.6 31.4 - 35.0 g/dL    RDW 14.3 11.9 - 14.6 %    Platelets 780 232 - 878 K/uL    MPV 10.5 9.4 - 12.3 FL    nRBC 0.00 0.0 - 0.2 K/uL    Differential Type AUTOMATED      Seg Neutrophils 55 43 - 78 %    Lymphocytes 29 13 - 44 %    Monocytes 11 4.0 - 12.0 %    Eosinophils % 5 0.5 - 7.8 %    Basophils 0 0.0 - 2.0 %    Immature Granulocytes 0 0.0 - 5.0 %    Segs Absolute 5.2 1.7 - 8.2 K/UL    Absolute Lymph # 2.8 0.5 - 4.6 K/UL    Absolute Mono # 1.0 0.1 - 1.3 K/UL    Absolute Eos # 0.5 0.0 - 0.8 K/UL    Basophils
attention, memory and reasoning are intact. Language and speech are normal.   On cranial nerve examination, (II, III, IV, VI) pupils are equal, round, and reactive to light. Visual acuity is adequate. Visual fields are full to finger confrontation. Extraocular motility is normal. (V, VII) Face is symmetric and sensation is intact to light touch. (VIII) Hearing is intact. (IX, X) Palate and uvula elevate symmetrically. Voice is normal. (XI) Shoulder shrug is strong and equal bilaterally. (XII)Tongue is midline. Motor examination - There is normal muscle tone and bulk. Power is full throughout. Muscle stretch reflexes are normoactive throughout. Intermittent choreiform movements involving all 4 limbs which are not distractible and he does not have entrainment.      Signed By: Patrice Wright DO     April 14, 2023
flush 0.9 % injection 5-40 mL  5-40 mL IntraVENous PRN    0.9 % sodium chloride infusion   IntraVENous PRN    polyethylene glycol (GLYCOLAX) packet 17 g  17 g Oral Daily PRN    acetaminophen (TYLENOL) tablet 650 mg  650 mg Oral Q6H PRN    Or    acetaminophen (TYLENOL) suppository 650 mg  650 mg Rectal Q6H PRN    insulin glargine (LANTUS) injection vial 25 Units  25 Units SubCUTAneous QAM    insulin lispro (HUMALOG) injection vial 0-8 Units  0-8 Units SubCUTAneous TID WC    insulin lispro (HUMALOG) injection vial 0-4 Units  0-4 Units SubCUTAneous Nightly    glucose chewable tablet 16 g  4 tablet Oral PRN    dextrose bolus 10% 125 mL  125 mL IntraVENous PRN    Or    dextrose bolus 10% 250 mL  250 mL IntraVENous PRN    glucagon (rDNA) injection 1 mg  1 mg SubCUTAneous PRN    dextrose 10 % infusion   IntraVENous Continuous PRN       Signed:  Washington Brooks MD    Part of this note may have been written by using a voice dictation software. The note has been proof read but may still contain some grammatical/other typographical errors.
in  etiology, with the primary differential considerations of  hyperglycemia-associated choreaballinism and manganese deposition from TPN or  liver failure. Chronically this can be seen in Jayson's disease, hepatic  encephalopathy and Fabry disease. Bones and extracranial soft tissues:    Marrow signal is normal. No focal osseous lesions. Orbits are unremarkable. Mastoid air cells and middle ear cavities are clear. Paranasal sinuses are  clear. Impression  1. Signal abnormality on T2 and T1 imaging in the caudate nuclei and putamen  bilaterally (symmetric). Most common acute etiologies for this finding would be  hyperglycemia-associated choreaballinism and manganese deposition from TPN or  liver failure. 2. Otherwise normal brain MRI within the limits of motion degradation. This examination was interpreted by Ronald Henderson M.D. Ronald Henderson M.D.  4/10/2023 10:07:00 PM       Most recent MRA   No results found for this or any previous visit. Most recent CTA  No results found for this or any previous visit. Most recent Echo  No results found for this or any previous visit. Physical Exam:  General - Well developed, well nourished, in no apparent distress. Pleasant and conversant. HEENT - Normocephalic, atraumatic. Conjunctiva are clear. Neck - Supple without masses  Extremities - Peripheral pulses intact. No edema and no rashes. Neurological examination - Comprehension, attention, memory and reasoning are intact. Language and speech are normal.   On cranial nerve examination, (II, III, IV, VI) pupils are equal, round, and reactive to light. Visual acuity is adequate. Visual fields are full to finger confrontation. Extraocular motility is normal. (V, VII) Face is symmetric and sensation is intact to light touch. (VIII) Hearing is intact. (IX, X) Palate and uvula elevate symmetrically.  Voice is normal. (XI) Shoulder shrug is strong and equal bilaterally. (XII)Tongue

## 2023-04-14 NOTE — DISCHARGE SUMMARY
with long-term current use of insulin (HCC)      amLODIPine (NORVASC) 10 MG tablet Take 1 tablet by mouth daily  Qty: 90 tablet, Refills: 0    Associated Diagnoses: Primary hypertension      blood glucose monitor strips 100 strips by Other route 4 times daily (before meals and nightly) Test 4 times a day & as needed for symptoms of irregular blood glucose. Qty: 100 strip, Refills: 5    Associated Diagnoses: Type 2 diabetes mellitus with hyperglycemia, with long-term current use of insulin ([facility])      Lancets 30G MISC Test 4x a day & as needed for symptoms of irregular blood glucose. Qty: 100 each, Refills: 5    Associated Diagnoses: Type 2 diabetes mellitus with hyperglycemia, with long-term current use of insulin (HCC)      Multiple Vitamin (MULTIVITAMIN ADULT PO) Take 1 tablet by mouth daily           STOP taking these medications       citalopram (CELEXA) 10 MG tablet Comments:   Reason for Stopping:               Some medications may have been reported old/obsolete and marked \"stop taking\" by the system; in reality pt was already off these meds; defer to outpatient or prescribing providers. Procedures done this admission:  * No surgery found *    Consults this admission:  IP CONSULT TO PHYSICAL MEDICINE REHAB    Echocardiogram results:  No results found for this or any previous visit. Diagnostic Imaging/Tests:   CT HEAD WO CONTRAST    Result Date: 4/10/2023  1. Patchy hypodensities in bilateral basal ganglia. These are nonspecific, and could represent age-indeterminate ischemic changes, toxic/metabolic insult, various encephalitides, demyelinating process, or CJD in the appropriate clinical setting. MRI of the brain without and with contrast is suggested for further evaluation. 2. Mild generalized brain volume loss. Fina Naranjo M.D. 4/10/2023 5:52:00 PM    MRI BRAIN WO CONTRAST    Result Date: 4/10/2023  1.  Signal abnormality on T2 and T1 imaging in the caudate nuclei and putamen bilaterally

## 2023-04-14 NOTE — CARE COORDINATION
Patient to discharge home today. Patient would prefer to participate in OP therapy services than admit to 9th floor [facility].   Referral sent to [facility] OP therapy services and contact information added to AVS.

## 2023-04-17 ENCOUNTER — OFFICE VISIT (OUTPATIENT)
Dept: INTERNAL MEDICINE CLINIC | Facility: CLINIC | Age: 53
End: 2023-04-17

## 2023-04-17 VITALS
HEIGHT: 72 IN | BODY MASS INDEX: 29.12 KG/M2 | DIASTOLIC BLOOD PRESSURE: 94 MMHG | SYSTOLIC BLOOD PRESSURE: 123 MMHG | WEIGHT: 215 LBS | HEART RATE: 103 BPM | OXYGEN SATURATION: 99 % | TEMPERATURE: 99 F

## 2023-04-17 DIAGNOSIS — E11.65 TYPE 2 DIABETES MELLITUS WITH HYPERGLYCEMIA, WITH LONG-TERM CURRENT USE OF INSULIN (HCC): ICD-10-CM

## 2023-04-17 DIAGNOSIS — R82.5 POSITIVE URINE DRUG SCREEN: ICD-10-CM

## 2023-04-17 DIAGNOSIS — G25.5 CHOREA: ICD-10-CM

## 2023-04-17 DIAGNOSIS — D64.9 ANEMIA, UNSPECIFIED TYPE: ICD-10-CM

## 2023-04-17 DIAGNOSIS — I10 PRIMARY HYPERTENSION: ICD-10-CM

## 2023-04-17 DIAGNOSIS — Z09 HOSPITAL DISCHARGE FOLLOW-UP: Primary | ICD-10-CM

## 2023-04-17 DIAGNOSIS — Z79.4 TYPE 2 DIABETES MELLITUS WITH HYPERGLYCEMIA, WITH LONG-TERM CURRENT USE OF INSULIN (HCC): ICD-10-CM

## 2023-04-17 RX ORDER — DOCUSATE SODIUM 100 MG/1
100 CAPSULE, LIQUID FILLED ORAL DAILY PRN
Qty: 90 CAPSULE | Refills: 0 | Status: SHIPPED | OUTPATIENT
Start: 2023-04-17

## 2023-04-17 RX ORDER — FERROUS SULFATE 325(65) MG
325 TABLET ORAL
Qty: 90 TABLET | Refills: 0 | Status: SHIPPED | OUTPATIENT
Start: 2023-04-17

## 2023-04-17 ASSESSMENT — ANXIETY QUESTIONNAIRES
1. FEELING NERVOUS, ANXIOUS, OR ON EDGE: 0
7. FEELING AFRAID AS IF SOMETHING AWFUL MIGHT HAPPEN: 0
3. WORRYING TOO MUCH ABOUT DIFFERENT THINGS: 0
4. TROUBLE RELAXING: 0
6. BECOMING EASILY ANNOYED OR IRRITABLE: 0
GAD7 TOTAL SCORE: 0
2. NOT BEING ABLE TO STOP OR CONTROL WORRYING: 0
5. BEING SO RESTLESS THAT IT IS HARD TO SIT STILL: 0

## 2023-04-17 ASSESSMENT — PATIENT HEALTH QUESTIONNAIRE - PHQ9
SUM OF ALL RESPONSES TO PHQ QUESTIONS 1-9: 0
2. FEELING DOWN, DEPRESSED OR HOPELESS: 0
SUM OF ALL RESPONSES TO PHQ QUESTIONS 1-9: 0
SUM OF ALL RESPONSES TO PHQ9 QUESTIONS 1 & 2: 0
SUM OF ALL RESPONSES TO PHQ QUESTIONS 1-9: 0
1. LITTLE INTEREST OR PLEASURE IN DOING THINGS: 0
SUM OF ALL RESPONSES TO PHQ QUESTIONS 1-9: 0

## 2023-04-17 ASSESSMENT — ENCOUNTER SYMPTOMS
VOMITING: 0
ABDOMINAL PAIN: 0
DIARRHEA: 0
BACK PAIN: 0
CONSTIPATION: 0
EYE PAIN: 0
SORE THROAT: 0
RHINORRHEA: 0
NAUSEA: 0
SHORTNESS OF BREATH: 0
SINUS PAIN: 0
COUGH: 0

## 2023-04-17 NOTE — PROGRESS NOTES
Negative for dizziness and headaches. Jerky movements   Psychiatric/Behavioral:  Negative for behavioral problems, sleep disturbance and suicidal ideas. The patient is not nervous/anxious. Vitals:    04/17/23 1510 04/17/23 1517   BP: (!) 136/104 (!) 123/94   Site: Right Upper Arm Right Upper Arm   Position: Sitting Sitting   Cuff Size: Medium Adult Medium Adult   Pulse: (!) 110 (!) 103   Temp: 99 °F (37.2 °C)    TempSrc: Temporal    SpO2: 99%    Weight: 215 lb (97.5 kg)    Height: 6' (1.829 m)               Physical Exam  Physical Exam  Constitutional:       General: He is not in acute distress. Appearance: He is not ill-appearing. HENT:      Head: Normocephalic and atraumatic. Eyes:      Pupils: Pupils are equal, round, and reactive to light. Cardiovascular:      Rate and Rhythm: Normal rate and regular rhythm. Pulmonary:      Effort: Pulmonary effort is normal. No respiratory distress. Breath sounds: Normal breath sounds. Abdominal:      General: Bowel sounds are normal.      Palpations: Abdomen is soft. Musculoskeletal:         General: Normal range of motion. Cervical back: Neck supple. Skin:     General: Skin is warm and dry. Neurological:      General: No focal deficit present. Mental Status: He is alert and oriented to person, place, and time. Coordination: Coordination abnormal.      Comments: + involuntary chorea movements. Psychiatric:         Mood and Affect: Mood normal.         Thought Content: Thought content normal.              Assessment/Plan:          ICD-10-CM    1. Hospital discharge follow-up  Z09 DE DISCHARGE MEDS RECONCILED W/ CURRENT OUTPATIENT MED LIST      2. Chorea  G25.5     Cont Zypreza and Keppra. Keep neurology appt 4/17/2023.      3. Type 2 diabetes mellitus with hyperglycemia, with long-term current use of insulin (HCC)  E11.65     Z79.4     Cont Humalog insulin and Semglee insulin.       4. Primary hypertension  I10

## 2023-04-19 ENCOUNTER — HOSPITAL ENCOUNTER (OUTPATIENT)
Dept: PHYSICAL THERAPY | Age: 53
Setting detail: RECURRING SERIES
Discharge: HOME | End: 2023-04-22
Payer: COMMERCIAL

## 2023-04-19 PROCEDURE — 97162 PT EVAL MOD COMPLEX 30 MIN: CPT

## 2023-04-19 PROCEDURE — 97110 THERAPEUTIC EXERCISES: CPT

## 2023-04-19 ASSESSMENT — PAIN SCALES - GENERAL: PAINLEVEL_OUTOF10: 0

## 2023-04-19 NOTE — THERAPY EVALUATION
Limitations Requiring Skilled Therapeutic Intervention: Decreased functional mobility ; Decreased ADL status; Decreased body mechanics; Decreased tolerance to work activity; Decreased strength; Decreased endurance; Decreased balance; Decreased high-level IADLs; Decreased coordination; Decreased fine motor control; Decreased posture       Therapy Prognosis:   Therapy Prognosis: Good       Initial Assessment Complexity:   Decision Making: Medium Complexity      PLAN   Effective Dates: 04/19/23 TO Plan of Care/Certification Expiration Date: 07/18/23     Frequency/Duration: Plan Frequency: 2x per week     Interventions Planned (Treatment may consist of any combination of the following):    Current Treatment Recommendations: Strengthening; ROM; Balance training; Functional mobility training; Transfer training; ADL/Self-care training; IADL training; Endurance training; Gait training; Stair training; Neuromuscular re-education; Manual; Return to work related activity; Home exercise program; Safety education & training; Patient/Caregiver education & training; Equipment evaluation, education, & procurement; Positioning; Dry needling; Vestibular rehab; Therapeutic activities       Short-Term Goals: Time Frame: 4 weeks  Patient will demonstrate understanding of a home exercise program independently. Patient will improve DGI score to at least 16/24 indicating increased safety with dynamic activities. Patient will improve dynamic gait and balance as evident by ambulating 150 feet without scissoring with LRAD. Discharge Goals: Time Frame: 8 weeks  Patient will improve bilateral SLS to at least 10 seconds to demonstrate gains in static balance. Patient will demonstrate an improvement in TUG score by at least 1 second to demonstrate decreased fall risk. Patient will improve DGI score to at least 19/24 indicating increased safety with dynamic activities.   Patient will verbalize 3 fall prevention strategies to improve safety

## 2023-04-19 NOTE — PROGRESS NOTES
Patient tolerated therapy well this morning. He demonstrates good strength, but lacks motor control. He verbalized understanding of his HEP handout. Communication/Consultation: PT encouraged patient to perform HEP consistently. Equipment provided today: None. Recommendations/Intent for next treatment session: Next visit will focus on balance, gait, and motor control.     Total Treatment Billable Duration: 38 minutes with eval  Time In: 2132  Time Out: [address]  }Post Session Pain  PT Visit Info  [address] Portal  MD Guidelines  Scanned Media  Benefits  MyChart    Future Appointments   Date Time Provider Cierra Bunrette   4/25/2023  9:30 AM Mandi Boucher, PT [facility]   4/26/2023  9:00 AM GERA Pena BSND [facility]   4/27/2023  9:30 AM Mandi Boucher PT [facility]   6/1/2023 10:20 AM GERA Sanchez - CNP [facility]

## 2023-04-25 ENCOUNTER — HOSPITAL ENCOUNTER (OUTPATIENT)
Dept: PHYSICAL THERAPY | Age: 53
Setting detail: RECURRING SERIES
End: 2023-04-25
Payer: COMMERCIAL

## 2023-04-25 LAB
CARDIOLIPIN AB IGM: NORMAL
CARDIOLIPIN AB, IGA: NORMAL
CARDIOLIPIN ANTIBODY, IGG: NORMAL
INTERPRETATION: NORMAL
Lab: NORMAL
Lab: NORMAL
PHOSPHATIDYLGLYCEROL AB, IGA: NORMAL
PHOSPHATIDYLGLYCEROL AB, IGG: NORMAL
PHOSPHATIDYLGLYCEROL AB, IGM: NORMAL
PHOSPHATIDYLINOSITOL AB, IGA: NORMAL
PHOSPHATIDYLINOSITOL AB, IGG: NORMAL
PHOSPHATIDYLINOSITOL AB, IGM: NORMAL
PHOSPHATIDYLSERINE IGA ANTIBODY: NORMAL
PHOSPHATIDYLSERINE IGG ANTIBODY: NORMAL
PHOSPHATIDYLSERINE IGM ANTIBODY: NORMAL
SPECIMEN STATUS: NORMAL

## 2023-04-26 ENCOUNTER — OFFICE VISIT (OUTPATIENT)
Dept: NEUROLOGY | Age: 53
End: 2023-04-26
Payer: COMMERCIAL

## 2023-04-26 VITALS
HEIGHT: 72 IN | SYSTOLIC BLOOD PRESSURE: 123 MMHG | OXYGEN SATURATION: 98 % | BODY MASS INDEX: 29.34 KG/M2 | HEART RATE: 86 BPM | DIASTOLIC BLOOD PRESSURE: 83 MMHG | WEIGHT: 216.6 LBS

## 2023-04-26 DIAGNOSIS — R25.8 CHOREIFORM MOVEMENTS: ICD-10-CM

## 2023-04-26 DIAGNOSIS — F19.10 POLYSUBSTANCE ABUSE (HCC): ICD-10-CM

## 2023-04-26 DIAGNOSIS — G25.5 CHOREA: ICD-10-CM

## 2023-04-26 DIAGNOSIS — E11.65 TYPE 2 DIABETES MELLITUS WITH HYPERGLYCEMIA, WITH LONG-TERM CURRENT USE OF INSULIN (HCC): ICD-10-CM

## 2023-04-26 DIAGNOSIS — Z71.6 TOBACCO ABUSE COUNSELING: ICD-10-CM

## 2023-04-26 DIAGNOSIS — Z79.4 TYPE 2 DIABETES MELLITUS WITH HYPERGLYCEMIA, WITH LONG-TERM CURRENT USE OF INSULIN (HCC): ICD-10-CM

## 2023-04-26 DIAGNOSIS — Z09 HOSPITAL DISCHARGE FOLLOW-UP: Primary | ICD-10-CM

## 2023-04-26 PROCEDURE — 3074F SYST BP LT 130 MM HG: CPT | Performed by: NURSE PRACTITIONER

## 2023-04-26 PROCEDURE — 1111F DSCHRG MED/CURRENT MED MERGE: CPT | Performed by: NURSE PRACTITIONER

## 2023-04-26 PROCEDURE — 3079F DIAST BP 80-89 MM HG: CPT | Performed by: NURSE PRACTITIONER

## 2023-04-26 PROCEDURE — 3046F HEMOGLOBIN A1C LEVEL >9.0%: CPT | Performed by: NURSE PRACTITIONER

## 2023-04-26 PROCEDURE — 99204 OFFICE O/P NEW MOD 45 MIN: CPT | Performed by: NURSE PRACTITIONER

## 2023-04-26 RX ORDER — LEVETIRACETAM 500 MG/1
500 TABLET ORAL 2 TIMES DAILY
Qty: 60 TABLET | Refills: 1 | Status: SHIPPED | OUTPATIENT
Start: 2023-04-26

## 2023-04-26 RX ORDER — TETRABENAZINE 12.5 MG/1
TABLET ORAL
Qty: 53 TABLET | Refills: 0 | Status: SHIPPED | OUTPATIENT
Start: 2023-04-26 | End: 2023-05-26

## 2023-04-26 RX ORDER — OLANZAPINE 15 MG/1
15 TABLET ORAL NIGHTLY
Qty: 30 TABLET | Refills: 2 | Status: SHIPPED | OUTPATIENT
Start: 2023-04-26

## 2023-04-26 ASSESSMENT — ENCOUNTER SYMPTOMS
ALLERGIC/IMMUNOLOGIC NEGATIVE: 1
EYES NEGATIVE: 1
GASTROINTESTINAL NEGATIVE: 1
RESPIRATORY NEGATIVE: 1

## 2023-04-26 ASSESSMENT — PATIENT HEALTH QUESTIONNAIRE - PHQ9
2. FEELING DOWN, DEPRESSED OR HOPELESS: 0
SUM OF ALL RESPONSES TO PHQ QUESTIONS 1-9: 0
SUM OF ALL RESPONSES TO PHQ9 QUESTIONS 1 & 2: 0
1. LITTLE INTEREST OR PLEASURE IN DOING THINGS: 0
SUM OF ALL RESPONSES TO PHQ QUESTIONS 1-9: 0

## 2023-04-26 NOTE — PATIENT INSTRUCTIONS
Will plan to start on tetrabenzaine 12.5 mg daily for 7 days, then 12.5 mg twice daily. Medication and side effects discussed. Will start Prior authorization process. If patient is able to tolerate, will plan to wean keppra and olanzapine. Continue Keppra 500 mg twice daily. Increase olanzapine 15 mg nightly.

## 2023-04-26 NOTE — PROGRESS NOTES
Mild cardiomegaly 2023    Seen on CXR    Positive urine drug screen 2023    + cocaine, + amphetamines, + THC       No past surgical history on file.     Family History   Problem Relation Age of Onset    Kidney Disease Mother     Alzheimer's Disease Father     No Known Problems Brother        Social History     Socioeconomic History    Marital status: Single   Tobacco Use    Smoking status: Some Days     Years: 20.00     Types: Cigarettes     Last attempt to quit: 3/14/2023     Years since quittin.1    Smokeless tobacco: Never   Vaping Use    Vaping Use: Never used   Substance and Sexual Activity    Alcohol use: Not Currently    Drug use: Not Currently     Frequency: 3.0 times per week     Types: Marijuana (Weed)     Comment: joints a week    Sexual activity: Not Currently     Social Determinants of Health     Financial Resource Strain: Low Risk     Difficulty of Paying Living Expenses: Not hard at all   Food Insecurity: No Food Insecurity    Worried About Running Out of Food in the Last Year: Never true    Ran Out of Food in the Last Year: Never true   Transportation Needs: Unknown    Lack of Transportation (Non-Medical): No   Housing Stability: Unknown    Unstable Housing in the Last Year: No         Current Outpatient Medications:     ferrous sulfate (IRON 325) 325 (65 Fe) MG tablet, Take 1 tablet by mouth daily (with breakfast), Disp: 90 tablet, Rfl: 0    Ascorbic Acid 500 MG CHEW, Take 1 tablet by mouth daily, Disp: 90 tablet, Rfl: 0    insulin lispro (HUMALOG KWIKPEN) 200 UNIT/ML SOPN pen, Inject 8 Units into the skin 3 times daily (before meals) HOLD UNTIL SEES PCP OR BLOOD GLUCOSE LESS THAN 150, Disp: 15 Adjustable Dose Pre-filled Pen Syringe, Rfl: 5    SEMGLEE, YFGN, 100 UNIT/ML SOPN, Inject 10 Units into the skin every morning (before breakfast), Disp: 500 mL, Rfl: 0    levETIRAcetam (KEPPRA) 500 MG tablet, Take 1 tablet by mouth 2 times daily, Disp: 60 tablet, Rfl: 0    OLANZapine

## 2023-04-27 ENCOUNTER — TELEPHONE (OUTPATIENT)
Dept: INTERNAL MEDICINE CLINIC | Facility: CLINIC | Age: 53
End: 2023-04-27

## 2023-04-27 ENCOUNTER — HOSPITAL ENCOUNTER (OUTPATIENT)
Dept: PHYSICAL THERAPY | Age: 53
Setting detail: RECURRING SERIES
End: 2023-04-27
Payer: COMMERCIAL

## 2023-04-27 NOTE — TELEPHONE ENCOUNTER
We received FMLA forms from [employer] for this pt. It was noted on the fax cover sheet that Freddie Bang NP is deferring this paperwork to Neurology, as they will have a better understanding of the pts condition and his inability/ability to work. This was faxed back to [employer] at f# [phone]  A fax confirmation was received.

## 2023-05-02 ENCOUNTER — TELEPHONE (OUTPATIENT)
Dept: NEUROLOGY | Age: 53
End: 2023-05-02

## 2023-05-24 NOTE — TELEPHONE ENCOUNTER
Pt called and said that someone called him and said that once the forms are completed he will receive a call to pay so the forms can be faxed. He said that he has not heard from anyone to do this?

## 2023-05-26 NOTE — TELEPHONE ENCOUNTER
Forms completed. Copy made. [employer] forms faxed to [phone]. Confirmation received. Forms put in folder at . Jovanni Monroe is going to call the pt.

## 2023-06-01 ENCOUNTER — OFFICE VISIT (OUTPATIENT)
Dept: INTERNAL MEDICINE CLINIC | Facility: CLINIC | Age: 53
End: 2023-06-01
Payer: COMMERCIAL

## 2023-06-01 VITALS
HEART RATE: 75 BPM | HEIGHT: 72 IN | TEMPERATURE: 98.6 F | SYSTOLIC BLOOD PRESSURE: 128 MMHG | WEIGHT: 222.4 LBS | BODY MASS INDEX: 30.12 KG/M2 | OXYGEN SATURATION: 98 % | DIASTOLIC BLOOD PRESSURE: 80 MMHG

## 2023-06-01 DIAGNOSIS — F17.210 CIGARETTE SMOKER: ICD-10-CM

## 2023-06-01 DIAGNOSIS — I10 PRIMARY HYPERTENSION: ICD-10-CM

## 2023-06-01 DIAGNOSIS — E11.65 TYPE 2 DIABETES MELLITUS WITH HYPERGLYCEMIA, WITH LONG-TERM CURRENT USE OF INSULIN (HCC): Primary | ICD-10-CM

## 2023-06-01 DIAGNOSIS — Z11.4 SCREENING FOR HIV (HUMAN IMMUNODEFICIENCY VIRUS): ICD-10-CM

## 2023-06-01 DIAGNOSIS — F12.90 MARIJUANA USE, EPISODIC: ICD-10-CM

## 2023-06-01 DIAGNOSIS — Z79.4 TYPE 2 DIABETES MELLITUS WITH HYPERGLYCEMIA, WITH LONG-TERM CURRENT USE OF INSULIN (HCC): Primary | ICD-10-CM

## 2023-06-01 DIAGNOSIS — Z91.89 ENCOUNTER FOR HEPATITIS C VIRUS SCREENING TEST FOR HIGH RISK PATIENT: ICD-10-CM

## 2023-06-01 DIAGNOSIS — D64.9 ANEMIA, UNSPECIFIED TYPE: ICD-10-CM

## 2023-06-01 DIAGNOSIS — Z11.59 ENCOUNTER FOR HEPATITIS C VIRUS SCREENING TEST FOR HIGH RISK PATIENT: ICD-10-CM

## 2023-06-01 DIAGNOSIS — Z12.11 SCREENING FOR COLON CANCER: ICD-10-CM

## 2023-06-01 LAB
BASOPHILS # BLD: 0.1 K/UL (ref 0–0.2)
BASOPHILS NFR BLD: 1 % (ref 0–2)
CREAT UR-MCNC: 352 MG/DL
DIFFERENTIAL METHOD BLD: ABNORMAL
EOSINOPHIL # BLD: 0.3 K/UL (ref 0–0.8)
EOSINOPHIL NFR BLD: 4 % (ref 0.5–7.8)
ERYTHROCYTE [DISTWIDTH] IN BLOOD BY AUTOMATED COUNT: 15.4 % (ref 11.9–14.6)
HCT VFR BLD AUTO: 39.8 % (ref 41.1–50.3)
HGB BLD-MCNC: 12.9 G/DL (ref 13.6–17.2)
IMM GRANULOCYTES # BLD AUTO: 0 K/UL (ref 0–0.5)
IMM GRANULOCYTES NFR BLD AUTO: 0 % (ref 0–5)
LYMPHOCYTES # BLD: 3.4 K/UL (ref 0.5–4.6)
LYMPHOCYTES NFR BLD: 35 % (ref 13–44)
MCH RBC QN AUTO: 29.2 PG (ref 26.1–32.9)
MCHC RBC AUTO-ENTMCNC: 32.4 G/DL (ref 31.4–35)
MCV RBC AUTO: 90 FL (ref 82–102)
MICROALBUMIN UR-MCNC: 4.09 MG/DL
MICROALBUMIN/CREAT UR-RTO: 12 MG/G (ref 0–30)
MONOCYTES # BLD: 0.9 K/UL (ref 0.1–1.3)
MONOCYTES NFR BLD: 9 % (ref 4–12)
NEUTS SEG # BLD: 4.9 K/UL (ref 1.7–8.2)
NEUTS SEG NFR BLD: 51 % (ref 43–78)
NRBC # BLD: 0 K/UL (ref 0–0.2)
PLATELET # BLD AUTO: 306 K/UL (ref 150–450)
PMV BLD AUTO: 10.7 FL (ref 9.4–12.3)
RBC # BLD AUTO: 4.42 M/UL (ref 4.23–5.6)
WBC # BLD AUTO: 9.6 K/UL (ref 4.3–11.1)

## 2023-06-01 PROCEDURE — 3079F DIAST BP 80-89 MM HG: CPT | Performed by: NURSE PRACTITIONER

## 2023-06-01 PROCEDURE — 3046F HEMOGLOBIN A1C LEVEL >9.0%: CPT | Performed by: NURSE PRACTITIONER

## 2023-06-01 PROCEDURE — 99214 OFFICE O/P EST MOD 30 MIN: CPT | Performed by: NURSE PRACTITIONER

## 2023-06-01 PROCEDURE — 3074F SYST BP LT 130 MM HG: CPT | Performed by: NURSE PRACTITIONER

## 2023-06-01 RX ORDER — INSULIN LISPRO 200 [IU]/ML
8 INJECTION, SOLUTION SUBCUTANEOUS
Qty: 15 ADJUSTABLE DOSE PRE-FILLED PEN SYRINGE | Refills: 5 | Status: SHIPPED | OUTPATIENT
Start: 2023-06-01 | End: 2023-06-02 | Stop reason: ALTCHOICE

## 2023-06-01 RX ORDER — FERROUS SULFATE 325(65) MG
325 TABLET ORAL
Qty: 90 TABLET | Status: CANCELLED | OUTPATIENT
Start: 2023-07-16

## 2023-06-01 RX ORDER — AMLODIPINE BESYLATE 10 MG/1
10 TABLET ORAL DAILY
Qty: 90 TABLET | Refills: 1 | Status: SHIPPED | OUTPATIENT
Start: 2023-06-12

## 2023-06-01 ASSESSMENT — PATIENT HEALTH QUESTIONNAIRE - PHQ9
1. LITTLE INTEREST OR PLEASURE IN DOING THINGS: 0
SUM OF ALL RESPONSES TO PHQ QUESTIONS 1-9: 0
SUM OF ALL RESPONSES TO PHQ9 QUESTIONS 1 & 2: 0
2. FEELING DOWN, DEPRESSED OR HOPELESS: 0

## 2023-06-01 ASSESSMENT — ENCOUNTER SYMPTOMS
RHINORRHEA: 0
VOMITING: 0
SHORTNESS OF BREATH: 0
SORE THROAT: 0
NAUSEA: 0
CONSTIPATION: 0
DIARRHEA: 0
SINUS PAIN: 0
EYE PAIN: 0
ABDOMINAL PAIN: 0
BACK PAIN: 0
COUGH: 0

## 2023-06-01 ASSESSMENT — ANXIETY QUESTIONNAIRES
1. FEELING NERVOUS, ANXIOUS, OR ON EDGE: 0
6. BECOMING EASILY ANNOYED OR IRRITABLE: 0
5. BEING SO RESTLESS THAT IT IS HARD TO SIT STILL: 0
4. TROUBLE RELAXING: 0
3. WORRYING TOO MUCH ABOUT DIFFERENT THINGS: 0
7. FEELING AFRAID AS IF SOMETHING AWFUL MIGHT HAPPEN: 0
2. NOT BEING ABLE TO STOP OR CONTROL WORRYING: 0
GAD7 TOTAL SCORE: 0

## 2023-06-01 NOTE — PROGRESS NOTES
[address]  Tel# [phone]  Fax# [phone]     Dominique Bosch, FN-BC  Family Nurse Practitioner            Date of Visit: 2023     Todd Luna (: 1970) is a 50 y.o. male  established patient, here for evaluation of the following chief complaint(s):    Chief Complaint   Patient presents with    Diabetes     The pt is in for a f/u on DM, Htn, and anxiety. The pt is fasting. Pt is still using 10U of Semglee daily, but it is no longer on his current list.  He states this will run out by around Saturday. Patient Care Team:  GERA Narvaez CNP as PCP - General (Nurse Practitioner Family)  GERA Narvaez CNP as PCP - Empaneled Provider         History of Present Illness        Presents here for follow up on chronic disease management and for medication refills. Diabetes/DM Type II  New Diagnosed at the hospital.  HgbA1C > 14%  Denies family history of diabetes. Since he was discharged at home, states he is confused about his insulin schedule. Diet: B - eggs, negrete, eating less bread          L/D - chicken breast, broccoli or a salad  Only eats once or twice a day. Wakes up at different times, 7 or 8 am or late at 10 pm      Blood sugar monitorins    Insulin: Semglee insulin 10 units SQ once a day                Humulog 8 units SC before meals      Hemoglobin A1C   Date Value Ref Range Status   2023 >14.0 (H) 4.8 - 5.6 % Final     Comment:     PERFORMED BY DILUTION              Hypertension  Chronic  Denies family hx  States he has not been on BP meds for years prior to ER/Admission 3/4/2023  Denies previous meds.   BP at home: 120s-130s/80s-90s  Med: Amlodipine 10 mg oral daily     BP Readings from Last 3 Encounters:   23 128/80   23 123/83   23 (!) 123/94            Alcohol Use  Has been drinking for 20 years  Denies problems with alcohol abuse  Drinks alcohol twice a week, beer or

## 2023-06-02 DIAGNOSIS — E11.65 TYPE 2 DIABETES MELLITUS WITH HYPERGLYCEMIA, WITH LONG-TERM CURRENT USE OF INSULIN (HCC): Primary | ICD-10-CM

## 2023-06-02 DIAGNOSIS — Z79.4 TYPE 2 DIABETES MELLITUS WITH HYPERGLYCEMIA, WITH LONG-TERM CURRENT USE OF INSULIN (HCC): Primary | ICD-10-CM

## 2023-06-02 DIAGNOSIS — D64.9 ANEMIA, UNSPECIFIED TYPE: ICD-10-CM

## 2023-06-02 LAB
ALBUMIN SERPL-MCNC: 3.7 G/DL (ref 3.5–5)
ALBUMIN/GLOB SERPL: 0.8 (ref 0.4–1.6)
ALP SERPL-CCNC: 82 U/L (ref 50–136)
ALT SERPL-CCNC: 18 U/L (ref 12–65)
ANION GAP SERPL CALC-SCNC: 9 MMOL/L (ref 2–11)
AST SERPL-CCNC: 13 U/L (ref 15–37)
BILIRUB SERPL-MCNC: 0.6 MG/DL (ref 0.2–1.1)
BUN SERPL-MCNC: 18 MG/DL (ref 6–23)
CALCIUM SERPL-MCNC: 9 MG/DL (ref 8.3–10.4)
CHLORIDE SERPL-SCNC: 107 MMOL/L (ref 101–110)
CHOLEST SERPL-MCNC: 245 MG/DL
CO2 SERPL-SCNC: 22 MMOL/L (ref 21–32)
CREAT SERPL-MCNC: 1.3 MG/DL (ref 0.8–1.5)
EST. AVERAGE GLUCOSE BLD GHB EST-MCNC: 140 MG/DL
GLOBULIN SER CALC-MCNC: 4.6 G/DL (ref 2.8–4.5)
GLUCOSE SERPL-MCNC: 115 MG/DL (ref 65–100)
HBA1C MFR BLD: 6.5 % (ref 4.8–5.6)
HCV AB SER QL: NONREACTIVE
HDLC SERPL-MCNC: 50 MG/DL (ref 40–60)
HDLC SERPL: 4.9
HIV 1+2 AB+HIV1 P24 AG SERPL QL IA: NONREACTIVE
HIV 1/2 RESULT COMMENT: NORMAL
LDLC SERPL CALC-MCNC: 142.4 MG/DL
POTASSIUM SERPL-SCNC: 3.5 MMOL/L (ref 3.5–5.1)
PROT SERPL-MCNC: 8.3 G/DL (ref 6.3–8.2)
SODIUM SERPL-SCNC: 138 MMOL/L (ref 133–143)
TRIGL SERPL-MCNC: 263 MG/DL (ref 35–150)
TSH, 3RD GENERATION: 3.22 UIU/ML (ref 0.36–3.74)
VLDLC SERPL CALC-MCNC: 52.6 MG/DL (ref 6–23)

## 2023-06-02 RX ORDER — ROSUVASTATIN CALCIUM 10 MG/1
10 TABLET, COATED ORAL DAILY
Qty: 90 TABLET | Refills: 0 | Status: SHIPPED | OUTPATIENT
Start: 2023-06-02

## 2023-06-02 RX ORDER — FERROUS SULFATE 325(65) MG
325 TABLET ORAL
Qty: 90 TABLET | Refills: 1 | Status: SHIPPED | OUTPATIENT
Start: 2023-06-02

## 2023-06-02 RX ORDER — DOCUSATE SODIUM 100 MG/1
100 CAPSULE, LIQUID FILLED ORAL DAILY PRN
Qty: 90 CAPSULE | Refills: 1 | Status: SHIPPED | OUTPATIENT
Start: 2023-06-02

## 2023-06-06 ENCOUNTER — TELEPHONE (OUTPATIENT)
Dept: INTERNAL MEDICINE CLINIC | Facility: CLINIC | Age: 53
End: 2023-06-06

## 2023-06-06 DIAGNOSIS — Z79.4 TYPE 2 DIABETES MELLITUS WITH HYPERGLYCEMIA, WITH LONG-TERM CURRENT USE OF INSULIN (HCC): Primary | ICD-10-CM

## 2023-06-06 DIAGNOSIS — E11.65 TYPE 2 DIABETES MELLITUS WITH HYPERGLYCEMIA, WITH LONG-TERM CURRENT USE OF INSULIN (HCC): Primary | ICD-10-CM

## 2023-06-06 NOTE — TELEPHONE ENCOUNTER
I called and notified the pt of the medication changed and recommendations. The pt verbalized understanding and was agreeable.

## 2023-06-06 NOTE — TELEPHONE ENCOUNTER
Patient states [facility] does not have a generic so he is unable to afford the medication. He wants to know if he should continue his insulin?

## 2023-06-16 ENCOUNTER — TELEPHONE (OUTPATIENT)
Dept: NEUROLOGY | Age: 53
End: 2023-06-16

## 2023-06-16 DIAGNOSIS — R25.8 CHOREIFORM MOVEMENTS: Primary | ICD-10-CM

## 2023-06-16 RX ORDER — OLANZAPINE 15 MG/1
15 TABLET ORAL NIGHTLY
Qty: 30 TABLET | Refills: 2 | Status: SHIPPED | OUTPATIENT
Start: 2023-06-16

## 2023-06-16 RX ORDER — LEVETIRACETAM 500 MG/1
500 TABLET ORAL 2 TIMES DAILY
Qty: 60 TABLET | Refills: 1 | Status: SHIPPED | OUTPATIENT
Start: 2023-06-16

## 2023-06-21 ENCOUNTER — OFFICE VISIT (OUTPATIENT)
Dept: NEUROLOGY | Age: 53
End: 2023-06-21
Payer: COMMERCIAL

## 2023-06-21 VITALS
WEIGHT: 230.6 LBS | HEART RATE: 106 BPM | SYSTOLIC BLOOD PRESSURE: 126 MMHG | OXYGEN SATURATION: 97 % | BODY MASS INDEX: 31.23 KG/M2 | DIASTOLIC BLOOD PRESSURE: 89 MMHG | HEIGHT: 72 IN

## 2023-06-21 DIAGNOSIS — E11.65 TYPE 2 DIABETES MELLITUS WITH HYPERGLYCEMIA, WITH LONG-TERM CURRENT USE OF INSULIN (HCC): ICD-10-CM

## 2023-06-21 DIAGNOSIS — F19.11 HISTORY OF SUBSTANCE ABUSE (HCC): ICD-10-CM

## 2023-06-21 DIAGNOSIS — Z79.4 TYPE 2 DIABETES MELLITUS WITH HYPERGLYCEMIA, WITH LONG-TERM CURRENT USE OF INSULIN (HCC): ICD-10-CM

## 2023-06-21 DIAGNOSIS — R25.8 CHOREIFORM MOVEMENTS: Primary | ICD-10-CM

## 2023-06-21 PROCEDURE — 99213 OFFICE O/P EST LOW 20 MIN: CPT | Performed by: NURSE PRACTITIONER

## 2023-06-21 PROCEDURE — 3044F HG A1C LEVEL LT 7.0%: CPT | Performed by: NURSE PRACTITIONER

## 2023-06-21 PROCEDURE — 3074F SYST BP LT 130 MM HG: CPT | Performed by: NURSE PRACTITIONER

## 2023-06-21 PROCEDURE — 3079F DIAST BP 80-89 MM HG: CPT | Performed by: NURSE PRACTITIONER

## 2023-06-21 ASSESSMENT — PATIENT HEALTH QUESTIONNAIRE - PHQ9
SUM OF ALL RESPONSES TO PHQ QUESTIONS 1-9: 0
SUM OF ALL RESPONSES TO PHQ QUESTIONS 1-9: 0
SUM OF ALL RESPONSES TO PHQ9 QUESTIONS 1 & 2: 0
2. FEELING DOWN, DEPRESSED OR HOPELESS: 0
SUM OF ALL RESPONSES TO PHQ QUESTIONS 1-9: 0
SUM OF ALL RESPONSES TO PHQ QUESTIONS 1-9: 0
1. LITTLE INTEREST OR PLEASURE IN DOING THINGS: 0

## 2023-06-21 ASSESSMENT — ENCOUNTER SYMPTOMS
EYES NEGATIVE: 1
GASTROINTESTINAL NEGATIVE: 1
RESPIRATORY NEGATIVE: 1
ALLERGIC/IMMUNOLOGIC NEGATIVE: 1

## 2023-06-21 NOTE — PATIENT INSTRUCTIONS
[facility]  Eligible commercially insured patients may pay $10 per 30-day prescription; for additional information contact the program at [phone].     Applies to:  Gilberto Gillespie  Number of uses:  Per prescription until program expires  Form more information phone: [phone] or Visit website

## 2023-06-21 NOTE — PROGRESS NOTES
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 Chronically this can be seen in Jayson's disease, hepatic encephalopathy and Fabry disease. Bones and extracranial soft tissues:    Marrow signal is normal. No focal osseous lesions. Orbits are unremarkable. Mastoid air cells and middle ear cavities are clear. Paranasal sinuses are  clear. Impression  1. Signal abnormality on T2 and T1 imaging in the caudate nuclei and putamen bilaterally (symmetric). 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 2. Otherwise normal brain MRI within the limits of motion degradation. This examination was interpreted by Maricruz Harrison M.D. Maricruz Harrison M.D.  4/10/2023 10:07:00 PM    Hemoglobin A1C   Date Value Ref Range Status   06/01/2023 6.5 (H) 4.8 - 5.6 % Final         1. Choreiform movements  Stable. Continue Zyprexa and keppra. Patient assistance information provided for [facility].  Patient has been advised to enroll in the program.  If patient is able to obtain financial assistance and tolerate medication we will plan to wean Keppra and Zyprexa. 2. Type 2 diabetes mellitus with hyperglycemia, with long-term current use of insulin (HCC)  Stable. Last A1c 6.5    3. History of substance abuse [facility])  Discussed importance of continued abstinence. Keep scheduled appointment with movement specialist at the [facility]. I spent  50% of the 25 total minutes of today's visit in coordination of care and patient/family education and counseling regarding the above patient concerns, reviewing the patient's medical record, my assessment and recommendations.               Chaim Gross, [address] [place] [address] Neurology [address], [address] [place] [phone]

## 2023-07-03 ENCOUNTER — CLINICAL DOCUMENTATION (OUTPATIENT)
Dept: NEUROLOGY | Age: 53
End: 2023-07-03

## 2023-07-03 NOTE — PROGRESS NOTES
The [employer] disability forms have been completed and faxed to [phone]. Confirmation received. Sent for scanning.

## 2023-07-14 ENCOUNTER — TELEPHONE (OUTPATIENT)
Dept: NEUROLOGY | Age: 53
End: 2023-07-14

## 2023-07-18 ENCOUNTER — TELEPHONE (OUTPATIENT)
Dept: NEUROLOGY | Age: 53
End: 2023-07-18

## 2023-07-31 ENCOUNTER — TELEPHONE (OUTPATIENT)
Dept: NEUROLOGY | Age: 53
End: 2023-07-31

## 2023-07-31 NOTE — TELEPHONE ENCOUNTER
[employer] FORMS received .    Pt at [facility] in October first visit   Forms in [facility] AT [facility]

## 2023-08-01 NOTE — TELEPHONE ENCOUNTER
[employer] forms complete faxed to [phone]  Confirmation received   Scanned to chart   Tried to call patient vm not set up yet

## 2023-08-28 DIAGNOSIS — I10 PRIMARY HYPERTENSION: ICD-10-CM

## 2023-08-28 DIAGNOSIS — Z79.4 TYPE 2 DIABETES MELLITUS WITH HYPERGLYCEMIA, WITH LONG-TERM CURRENT USE OF INSULIN (HCC): ICD-10-CM

## 2023-08-28 DIAGNOSIS — E11.65 TYPE 2 DIABETES MELLITUS WITH HYPERGLYCEMIA, WITH LONG-TERM CURRENT USE OF INSULIN (HCC): ICD-10-CM

## 2023-08-28 RX ORDER — AMLODIPINE BESYLATE 10 MG/1
10 TABLET ORAL DAILY
Qty: 20 TABLET | Refills: 0 | Status: SHIPPED | OUTPATIENT
Start: 2023-08-28

## 2023-08-28 RX ORDER — ROSUVASTATIN CALCIUM 10 MG/1
10 TABLET, COATED ORAL DAILY
Qty: 20 TABLET | Refills: 0 | Status: SHIPPED | OUTPATIENT
Start: 2023-08-28

## 2023-08-28 NOTE — TELEPHONE ENCOUNTER
Refills are requested - pended  Patient had to reschedule his appointment until 9-13-23, needs enough medication to get him through until then.

## 2023-09-18 ENCOUNTER — OFFICE VISIT (OUTPATIENT)
Dept: INTERNAL MEDICINE CLINIC | Facility: CLINIC | Age: 53
End: 2023-09-18
Payer: COMMERCIAL

## 2023-09-18 VITALS
DIASTOLIC BLOOD PRESSURE: 88 MMHG | OXYGEN SATURATION: 99 % | WEIGHT: 226.4 LBS | HEIGHT: 72 IN | BODY MASS INDEX: 30.66 KG/M2 | HEART RATE: 72 BPM | TEMPERATURE: 98.6 F | SYSTOLIC BLOOD PRESSURE: 129 MMHG

## 2023-09-18 DIAGNOSIS — I10 PRIMARY HYPERTENSION: ICD-10-CM

## 2023-09-18 DIAGNOSIS — D64.9 ANEMIA, UNSPECIFIED TYPE: ICD-10-CM

## 2023-09-18 DIAGNOSIS — E11.65 TYPE 2 DIABETES MELLITUS WITH HYPERGLYCEMIA, WITH LONG-TERM CURRENT USE OF INSULIN (HCC): ICD-10-CM

## 2023-09-18 DIAGNOSIS — E11.65 TYPE 2 DIABETES MELLITUS WITH HYPERGLYCEMIA, WITH LONG-TERM CURRENT USE OF INSULIN (HCC): Primary | ICD-10-CM

## 2023-09-18 DIAGNOSIS — E78.2 MIXED HYPERLIPIDEMIA: ICD-10-CM

## 2023-09-18 DIAGNOSIS — Z79.4 TYPE 2 DIABETES MELLITUS WITH HYPERGLYCEMIA, WITH LONG-TERM CURRENT USE OF INSULIN (HCC): Primary | ICD-10-CM

## 2023-09-18 DIAGNOSIS — Z79.4 TYPE 2 DIABETES MELLITUS WITH HYPERGLYCEMIA, WITH LONG-TERM CURRENT USE OF INSULIN (HCC): ICD-10-CM

## 2023-09-18 DIAGNOSIS — F17.210 CIGARETTE SMOKER: ICD-10-CM

## 2023-09-18 PROBLEM — R56.9 UNSPECIFIED CONVULSIONS (HCC): Status: ACTIVE | Noted: 2023-09-18

## 2023-09-18 PROBLEM — G40.89 OTHER SEIZURES (HCC): Status: ACTIVE | Noted: 2023-09-18

## 2023-09-18 LAB
BASOPHILS # BLD: 0.1 K/UL (ref 0–0.2)
BASOPHILS NFR BLD: 1 % (ref 0–2)
DIFFERENTIAL METHOD BLD: ABNORMAL
EOSINOPHIL # BLD: 0.8 K/UL (ref 0–0.8)
EOSINOPHIL NFR BLD: 6 % (ref 0.5–7.8)
ERYTHROCYTE [DISTWIDTH] IN BLOOD BY AUTOMATED COUNT: 13.2 % (ref 11.9–14.6)
HCT VFR BLD AUTO: 37.9 % (ref 41.1–50.3)
HGB BLD-MCNC: 12.3 G/DL (ref 13.6–17.2)
IMM GRANULOCYTES # BLD AUTO: 0 K/UL (ref 0–0.5)
IMM GRANULOCYTES NFR BLD AUTO: 0 % (ref 0–5)
LYMPHOCYTES # BLD: 3.3 K/UL (ref 0.5–4.6)
LYMPHOCYTES NFR BLD: 27 % (ref 13–44)
MCH RBC QN AUTO: 30.1 PG (ref 26.1–32.9)
MCHC RBC AUTO-ENTMCNC: 32.5 G/DL (ref 31.4–35)
MCV RBC AUTO: 92.7 FL (ref 82–102)
MONOCYTES # BLD: 1.1 K/UL (ref 0.1–1.3)
MONOCYTES NFR BLD: 9 % (ref 4–12)
NEUTS SEG # BLD: 7 K/UL (ref 1.7–8.2)
NEUTS SEG NFR BLD: 57 % (ref 43–78)
NRBC # BLD: 0 K/UL (ref 0–0.2)
PLATELET # BLD AUTO: 273 K/UL (ref 150–450)
PMV BLD AUTO: 11.4 FL (ref 9.4–12.3)
RBC # BLD AUTO: 4.09 M/UL (ref 4.23–5.6)
WBC # BLD AUTO: 12.3 K/UL (ref 4.3–11.1)

## 2023-09-18 PROCEDURE — 3044F HG A1C LEVEL LT 7.0%: CPT | Performed by: NURSE PRACTITIONER

## 2023-09-18 PROCEDURE — 3079F DIAST BP 80-89 MM HG: CPT | Performed by: NURSE PRACTITIONER

## 2023-09-18 PROCEDURE — 3074F SYST BP LT 130 MM HG: CPT | Performed by: NURSE PRACTITIONER

## 2023-09-18 PROCEDURE — 99214 OFFICE O/P EST MOD 30 MIN: CPT | Performed by: NURSE PRACTITIONER

## 2023-09-18 RX ORDER — GLUCOSAMINE HCL/CHONDROITIN SU 500-400 MG
100 CAPSULE ORAL
Qty: 100 STRIP | Refills: 5 | Status: SHIPPED | OUTPATIENT
Start: 2023-09-18 | End: 2023-10-18

## 2023-09-18 RX ORDER — AMLODIPINE BESYLATE 10 MG/1
10 TABLET ORAL DAILY
Qty: 90 TABLET | Refills: 1 | Status: SHIPPED | OUTPATIENT
Start: 2023-09-18

## 2023-09-18 RX ORDER — ROSUVASTATIN CALCIUM 10 MG/1
10 TABLET, COATED ORAL DAILY
Qty: 90 TABLET | Refills: 1 | Status: SHIPPED | OUTPATIENT
Start: 2023-09-18

## 2023-09-18 ASSESSMENT — ENCOUNTER SYMPTOMS
VOMITING: 0
SINUS PAIN: 0
ABDOMINAL PAIN: 0
SORE THROAT: 0
COUGH: 0
BACK PAIN: 0
RHINORRHEA: 0
SHORTNESS OF BREATH: 0
DIARRHEA: 0
EYE PAIN: 0
CONSTIPATION: 0
NAUSEA: 0

## 2023-09-18 NOTE — PROGRESS NOTES
Date:   10/18/2023    Lipid Panel     Standing Status:   Future     Standing Expiration Date:   12/18/2023     Order Specific Question:   Is Patient Fasting?/# of Hours     Answer: Yes                  Follow Up  Return in about 6 months (around 3/18/2024), or if symptoms worsen or fail to improve, for ROV with fasting labs.              Re Jo, ANGIE, FNP-BC

## 2023-09-19 LAB
ALBUMIN SERPL-MCNC: 3.8 G/DL (ref 3.5–5)
ALBUMIN/GLOB SERPL: 1 (ref 0.4–1.6)
ALP SERPL-CCNC: 74 U/L (ref 50–136)
ALT SERPL-CCNC: 17 U/L (ref 12–65)
ANION GAP SERPL CALC-SCNC: 6 MMOL/L (ref 2–11)
AST SERPL-CCNC: 12 U/L (ref 15–37)
BILIRUB SERPL-MCNC: 0.3 MG/DL (ref 0.2–1.1)
BUN SERPL-MCNC: 20 MG/DL (ref 6–23)
CALCIUM SERPL-MCNC: 9 MG/DL (ref 8.3–10.4)
CHLORIDE SERPL-SCNC: 111 MMOL/L (ref 101–110)
CHOLEST SERPL-MCNC: 130 MG/DL
CO2 SERPL-SCNC: 25 MMOL/L (ref 21–32)
CREAT SERPL-MCNC: 1.3 MG/DL (ref 0.8–1.5)
GLOBULIN SER CALC-MCNC: 3.8 G/DL (ref 2.8–4.5)
GLUCOSE SERPL-MCNC: 116 MG/DL (ref 65–100)
HDLC SERPL-MCNC: 46 MG/DL (ref 40–60)
HDLC SERPL: 2.8
LDLC SERPL CALC-MCNC: 44.8 MG/DL
POTASSIUM SERPL-SCNC: 4.1 MMOL/L (ref 3.5–5.1)
PROT SERPL-MCNC: 7.6 G/DL (ref 6.3–8.2)
SODIUM SERPL-SCNC: 142 MMOL/L (ref 133–143)
TRIGL SERPL-MCNC: 196 MG/DL (ref 35–150)
VLDLC SERPL CALC-MCNC: 39.2 MG/DL (ref 6–23)

## 2023-09-23 DIAGNOSIS — R25.8 CHOREIFORM MOVEMENTS: ICD-10-CM

## 2023-09-25 RX ORDER — LEVETIRACETAM 500 MG/1
500 TABLET ORAL 2 TIMES DAILY
Qty: 60 TABLET | Refills: 1 | OUTPATIENT
Start: 2023-09-25

## 2023-12-28 DIAGNOSIS — R25.8 CHOREIFORM MOVEMENTS: ICD-10-CM

## 2023-12-28 RX ORDER — OLANZAPINE 15 MG/1
15 TABLET ORAL NIGHTLY
Qty: 30 TABLET | Refills: 2 | OUTPATIENT
Start: 2023-12-28

## 2024-02-07 DIAGNOSIS — Z79.4 TYPE 2 DIABETES MELLITUS WITH HYPERGLYCEMIA, WITH LONG-TERM CURRENT USE OF INSULIN (HCC): ICD-10-CM

## 2024-02-07 DIAGNOSIS — E11.65 TYPE 2 DIABETES MELLITUS WITH HYPERGLYCEMIA, WITH LONG-TERM CURRENT USE OF INSULIN (HCC): ICD-10-CM

## 2024-02-08 NOTE — TELEPHONE ENCOUNTER
Patient request a refill of metformin 500 mg, taking one tablet bid with a meal.  Medication was last prescribed 9-18-23 #120 1 RF,  Patient was last seen 9-18-23 Next  3-20-24 Pharmacy used is the [facility] on [address].

## 2024-03-20 ENCOUNTER — OFFICE VISIT (OUTPATIENT)
Dept: INTERNAL MEDICINE CLINIC | Facility: CLINIC | Age: 54
End: 2024-03-20
Payer: COMMERCIAL

## 2024-03-20 VITALS
HEIGHT: 72 IN | HEART RATE: 67 BPM | WEIGHT: 226.4 LBS | SYSTOLIC BLOOD PRESSURE: 120 MMHG | TEMPERATURE: 98.4 F | OXYGEN SATURATION: 99 % | BODY MASS INDEX: 30.66 KG/M2 | DIASTOLIC BLOOD PRESSURE: 80 MMHG | RESPIRATION RATE: 16 BRPM

## 2024-03-20 DIAGNOSIS — I10 PRIMARY HYPERTENSION: ICD-10-CM

## 2024-03-20 DIAGNOSIS — F19.11 HISTORY OF SUBSTANCE ABUSE (HCC): ICD-10-CM

## 2024-03-20 DIAGNOSIS — E78.2 MIXED HYPERLIPIDEMIA: ICD-10-CM

## 2024-03-20 DIAGNOSIS — F17.210 CIGARETTE SMOKER: ICD-10-CM

## 2024-03-20 DIAGNOSIS — D64.9 ANEMIA, UNSPECIFIED TYPE: ICD-10-CM

## 2024-03-20 DIAGNOSIS — E11.65 TYPE 2 DIABETES MELLITUS WITH HYPERGLYCEMIA, WITHOUT LONG-TERM CURRENT USE OF INSULIN (HCC): ICD-10-CM

## 2024-03-20 DIAGNOSIS — Z12.11 SCREENING FOR COLON CANCER: ICD-10-CM

## 2024-03-20 DIAGNOSIS — E11.65 TYPE 2 DIABETES MELLITUS WITH HYPERGLYCEMIA, WITHOUT LONG-TERM CURRENT USE OF INSULIN (HCC): Primary | ICD-10-CM

## 2024-03-20 PROBLEM — G40.89 OTHER SEIZURES (HCC): Status: RESOLVED | Noted: 2023-09-18 | Resolved: 2024-03-20

## 2024-03-20 PROBLEM — R25.9 ABNORMAL INVOLUNTARY MOVEMENTS: Status: RESOLVED | Noted: 2023-04-10 | Resolved: 2024-03-20

## 2024-03-20 PROBLEM — G25.5 CHOREA: Status: RESOLVED | Noted: 2023-04-10 | Resolved: 2024-03-20

## 2024-03-20 PROBLEM — F12.90 MARIJUANA USE, EPISODIC: Status: RESOLVED | Noted: 2023-06-01 | Resolved: 2024-03-20

## 2024-03-20 PROBLEM — R56.9 UNSPECIFIED CONVULSIONS (HCC): Status: RESOLVED | Noted: 2023-09-18 | Resolved: 2024-03-20

## 2024-03-20 PROBLEM — F41.1 GAD (GENERALIZED ANXIETY DISORDER): Status: RESOLVED | Noted: 2023-03-14 | Resolved: 2024-03-20

## 2024-03-20 PROBLEM — F19.10 POLYSUBSTANCE ABUSE (HCC): Status: RESOLVED | Noted: 2023-03-04 | Resolved: 2024-03-20

## 2024-03-20 LAB
ALBUMIN SERPL-MCNC: 3.8 G/DL (ref 3.5–5)
ALBUMIN/GLOB SERPL: 1.2 (ref 0.4–1.6)
ALP SERPL-CCNC: 66 U/L (ref 50–136)
ALT SERPL-CCNC: 23 U/L (ref 12–65)
ANION GAP SERPL CALC-SCNC: 3 MMOL/L (ref 2–11)
AST SERPL-CCNC: 12 U/L (ref 15–37)
BASOPHILS # BLD: 0.1 K/UL (ref 0–0.2)
BASOPHILS NFR BLD: 1 % (ref 0–2)
BILIRUB SERPL-MCNC: 0.7 MG/DL (ref 0.2–1.1)
BUN SERPL-MCNC: 20 MG/DL (ref 6–23)
CALCIUM SERPL-MCNC: 9.8 MG/DL (ref 8.3–10.4)
CHLORIDE SERPL-SCNC: 110 MMOL/L (ref 103–113)
CHOLEST SERPL-MCNC: 125 MG/DL
CO2 SERPL-SCNC: 28 MMOL/L (ref 21–32)
CREAT SERPL-MCNC: 1.1 MG/DL (ref 0.8–1.5)
DIFFERENTIAL METHOD BLD: ABNORMAL
EOSINOPHIL # BLD: 0.6 K/UL (ref 0–0.8)
EOSINOPHIL NFR BLD: 6 % (ref 0.5–7.8)
ERYTHROCYTE [DISTWIDTH] IN BLOOD BY AUTOMATED COUNT: 13.7 % (ref 11.9–14.6)
GLOBULIN SER CALC-MCNC: 3.3 G/DL (ref 2.8–4.5)
GLUCOSE SERPL-MCNC: 117 MG/DL (ref 65–100)
HCT VFR BLD AUTO: 38.2 % (ref 41.1–50.3)
HDLC SERPL-MCNC: 45 MG/DL (ref 40–60)
HDLC SERPL: 2.8
HGB BLD-MCNC: 12.5 G/DL (ref 13.6–17.2)
IMM GRANULOCYTES # BLD AUTO: 0 K/UL (ref 0–0.5)
IMM GRANULOCYTES NFR BLD AUTO: 0 % (ref 0–5)
LDLC SERPL CALC-MCNC: 54.2 MG/DL
LYMPHOCYTES # BLD: 3.4 K/UL (ref 0.5–4.6)
LYMPHOCYTES NFR BLD: 32 % (ref 13–44)
MCH RBC QN AUTO: 29.3 PG (ref 26.1–32.9)
MCHC RBC AUTO-ENTMCNC: 32.7 G/DL (ref 31.4–35)
MCV RBC AUTO: 89.5 FL (ref 82–102)
MONOCYTES # BLD: 1.1 K/UL (ref 0.1–1.3)
MONOCYTES NFR BLD: 10 % (ref 4–12)
NEUTS SEG # BLD: 5.4 K/UL (ref 1.7–8.2)
NEUTS SEG NFR BLD: 51 % (ref 43–78)
NRBC # BLD: 0 K/UL (ref 0–0.2)
PLATELET # BLD AUTO: 334 K/UL (ref 150–450)
PMV BLD AUTO: 10.6 FL (ref 9.4–12.3)
POTASSIUM SERPL-SCNC: 5 MMOL/L (ref 3.5–5.1)
PROT SERPL-MCNC: 7.1 G/DL (ref 6.3–8.2)
RBC # BLD AUTO: 4.27 M/UL (ref 4.23–5.6)
SODIUM SERPL-SCNC: 141 MMOL/L (ref 136–146)
TRIGL SERPL-MCNC: 129 MG/DL (ref 35–150)
TSH, 3RD GENERATION: 1.49 UIU/ML (ref 0.36–3.74)
VLDLC SERPL CALC-MCNC: 25.8 MG/DL (ref 6–23)
WBC # BLD AUTO: 10.6 K/UL (ref 4.3–11.1)

## 2024-03-20 PROCEDURE — 3074F SYST BP LT 130 MM HG: CPT | Performed by: NURSE PRACTITIONER

## 2024-03-20 PROCEDURE — 99214 OFFICE O/P EST MOD 30 MIN: CPT | Performed by: NURSE PRACTITIONER

## 2024-03-20 PROCEDURE — 3079F DIAST BP 80-89 MM HG: CPT | Performed by: NURSE PRACTITIONER

## 2024-03-20 RX ORDER — AMLODIPINE BESYLATE 10 MG/1
10 TABLET ORAL DAILY
Qty: 90 TABLET | Refills: 1 | Status: SHIPPED | OUTPATIENT
Start: 2024-03-20

## 2024-03-20 RX ORDER — ROSUVASTATIN CALCIUM 10 MG/1
10 TABLET, COATED ORAL DAILY
Qty: 90 TABLET | Refills: 1 | Status: SHIPPED | OUTPATIENT
Start: 2024-03-20

## 2024-03-20 RX ORDER — FERROUS SULFATE 325(65) MG
325 TABLET ORAL
Qty: 90 TABLET | Refills: 1 | Status: SHIPPED | OUTPATIENT
Start: 2024-03-20

## 2024-03-20 SDOH — ECONOMIC STABILITY: INCOME INSECURITY: IN THE LAST 12 MONTHS, WAS THERE A TIME WHEN YOU WERE NOT ABLE TO PAY THE MORTGAGE OR RENT ON TIME?: NO

## 2024-03-20 SDOH — ECONOMIC STABILITY: FOOD INSECURITY: WITHIN THE PAST 12 MONTHS, YOU WORRIED THAT YOUR FOOD WOULD RUN OUT BEFORE YOU GOT MONEY TO BUY MORE.: NEVER TRUE

## 2024-03-20 SDOH — ECONOMIC STABILITY: FOOD INSECURITY: WITHIN THE PAST 12 MONTHS, THE FOOD YOU BOUGHT JUST DIDN'T LAST AND YOU DIDN'T HAVE MONEY TO GET MORE.: NEVER TRUE

## 2024-03-20 SDOH — ECONOMIC STABILITY: INCOME INSECURITY: HOW HARD IS IT FOR YOU TO PAY FOR THE VERY BASICS LIKE FOOD, HOUSING, MEDICAL CARE, AND HEATING?: NOT HARD AT ALL

## 2024-03-20 ASSESSMENT — ANXIETY QUESTIONNAIRES
1. FEELING NERVOUS, ANXIOUS, OR ON EDGE: NOT AT ALL
4. TROUBLE RELAXING: NOT AT ALL
5. BEING SO RESTLESS THAT IT IS HARD TO SIT STILL: NOT AT ALL
IF YOU CHECKED OFF ANY PROBLEMS ON THIS QUESTIONNAIRE, HOW DIFFICULT HAVE THESE PROBLEMS MADE IT FOR YOU TO DO YOUR WORK, TAKE CARE OF THINGS AT HOME, OR GET ALONG WITH OTHER PEOPLE: NOT DIFFICULT AT ALL
7. FEELING AFRAID AS IF SOMETHING AWFUL MIGHT HAPPEN: NOT AT ALL
GAD7 TOTAL SCORE: 0
6. BECOMING EASILY ANNOYED OR IRRITABLE: NOT AT ALL
2. NOT BEING ABLE TO STOP OR CONTROL WORRYING: NOT AT ALL
3. WORRYING TOO MUCH ABOUT DIFFERENT THINGS: NOT AT ALL

## 2024-03-20 ASSESSMENT — ENCOUNTER SYMPTOMS
SHORTNESS OF BREATH: 0
DIARRHEA: 0
NAUSEA: 0
ABDOMINAL PAIN: 0
BACK PAIN: 0
COUGH: 0
SORE THROAT: 0
CONSTIPATION: 0
RHINORRHEA: 0
SINUS PAIN: 0
VOMITING: 0

## 2024-03-20 ASSESSMENT — PATIENT HEALTH QUESTIONNAIRE - PHQ9
SUM OF ALL RESPONSES TO PHQ QUESTIONS 1-9: 0
SUM OF ALL RESPONSES TO PHQ QUESTIONS 1-9: 0
SUM OF ALL RESPONSES TO PHQ9 QUESTIONS 1 & 2: 0
2. FEELING DOWN, DEPRESSED OR HOPELESS: NOT AT ALL
SUM OF ALL RESPONSES TO PHQ QUESTIONS 1-9: 0
SUM OF ALL RESPONSES TO PHQ QUESTIONS 1-9: 0
1. LITTLE INTEREST OR PLEASURE IN DOING THINGS: NOT AT ALL

## 2024-03-20 NOTE — PROGRESS NOTES
[facility]  [address]  [facility] [address]  Tel# [phone]  Fax# [phone]     Maris Pederson DNP, FNP-BC  Family Nurse Practitioner            Date of Visit: 2024     Parish Juan (: 1970) is a 53 y.o. male  established patient, here for evaluation of the following chief complaint(s):    Chief Complaint   Patient presents with    Diabetes    New Patient    Anxiety    Medication Refill    Hypertension     Elevated BP in the office         Patient Care Team:  Maris Pederson APRN - CNP as PCP - General (Nurse Practitioner Family)  Maris Pederson APRN - CNP as PCP - Empaneled Provider         History of Present Illness      Presents here for follow up on chronic disease management and for medication refills and for fasting labs     Diabetes/DM Type II  New Diagnosed at the hospital.  HgbA1C > 14%  Denies family history of diabetes.  Since he was discharged at home, states he is confused about his insulin schedule.     Diet: B - eggs, negrete, eating less bread          L/D - chicken breast, broccoli or a salad  Only eats once or twice a day.  Wakes up at different times, 7 or 8 am or late at 10 pm   Most recently, eating more veges, eating less in general; avoid late meals      Blood sugar monitorins     Previously,  Insulin: Semglee insulin 10 units SQ once a day                Humulog 8 units SC before meals     2023, currently, Metformin 500 mg 1 tab orally twice a day      3/20/2024  BS randomly avg 90s  Still tolerating Metformin    Hemoglobin A1C   Date Value Ref Range Status   2023 6.5 (H) 4.8 - 5.6 % Final             Hypertension  Chronic  Denies family hx  States he has not been on BP meds for years prior to ER/Admission 3/4/2023  Denies previous meds.  BP at home: 120s-130s/80s-90s  Med: Amlodipine 10 mg oral daily          3/20/2024  Diet B fast foods         L fast foods         D fast foods    Physical activity: physical active at work - walking at

## 2024-03-21 LAB
EST. AVERAGE GLUCOSE BLD GHB EST-MCNC: 123 MG/DL
HBA1C MFR BLD: 5.9 % (ref 4.8–5.6)